# Patient Record
Sex: FEMALE | Race: WHITE | NOT HISPANIC OR LATINO | Employment: OTHER | URBAN - METROPOLITAN AREA
[De-identification: names, ages, dates, MRNs, and addresses within clinical notes are randomized per-mention and may not be internally consistent; named-entity substitution may affect disease eponyms.]

---

## 2018-11-07 ENCOUNTER — OFFICE VISIT (OUTPATIENT)
Dept: ORTHOPEDIC SURGERY | Facility: CLINIC | Age: 68
End: 2018-11-07

## 2018-11-07 VITALS — BODY MASS INDEX: 22.36 KG/M2 | WEIGHT: 130.95 LBS | HEART RATE: 83 BPM | HEIGHT: 64 IN | OXYGEN SATURATION: 98 %

## 2018-11-07 DIAGNOSIS — R52 PAIN: Primary | ICD-10-CM

## 2018-11-07 DIAGNOSIS — M76.72 PERONEAL TENDINITIS OF LEFT LOWER EXTREMITY: ICD-10-CM

## 2018-11-07 DIAGNOSIS — M05.79 RHEUMATOID ARTHRITIS INVOLVING MULTIPLE SITES WITH POSITIVE RHEUMATOID FACTOR (HCC): ICD-10-CM

## 2018-11-07 PROCEDURE — 99203 OFFICE O/P NEW LOW 30 MIN: CPT | Performed by: ORTHOPAEDIC SURGERY

## 2018-11-07 RX ORDER — ESTRADIOL 1 MG/1
1 TABLET ORAL WEEKLY
Refills: 3 | COMMUNITY
Start: 2018-10-09 | End: 2020-12-14

## 2018-11-07 RX ORDER — BUPROPION HYDROCHLORIDE 150 MG/1
150 TABLET ORAL DAILY
Refills: 3 | COMMUNITY
Start: 2018-10-09 | End: 2020-08-24

## 2018-11-07 RX ORDER — TRAZODONE HYDROCHLORIDE 50 MG/1
100 TABLET ORAL NIGHTLY PRN
Refills: 3 | COMMUNITY
Start: 2018-10-09

## 2018-11-07 NOTE — PROGRESS NOTES
NEW PATIENT    Patient: Clare Gao  : 1950    Primary Care Provider: Chris Cid MD    Requesting Provider: As above    Pain of the Left Foot      History    Chief Complaint: left foot pain     History of Present Illness: this is an extremely pleasant 68 year old woman with 1+ year history of pain and swelling over the base of the left fifth metatarsal.  No specific history of injury.  It is worse with activity, better with rest.  She rates the pain as 7-9 out of 10, sharp and aching.  She also occasionally has had pain under the right foot second metatarsal head.  She has seen podiatry and had injections in both areas.  She reports that she had a left fifth metatarsal base injection about 8-9 months ago, it helped for a short period of time.  She has not had orthotics.  About 6 months ago she was diagnosed with rheumatoid arthritis.  She is not currently on any of the rheumatic medications.  She sees a hand therapist, Dr. Diaz Vazquez, for pain in her hands.  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    No current outpatient prescriptions on file prior to visit.     No current facility-administered medications on file prior to visit.       Allergies   Allergen Reactions   • Codeine GI Intolerance      Past Medical History:   Diagnosis Date   • Osteoarthritis      Past Surgical History:   Procedure Laterality Date   • HYSTERECTOMY       Family History   Problem Relation Age of Onset   • Diabetes Father       Social History     Social History   • Marital status:      Spouse name: N/A   • Number of children: N/A   • Years of education: N/A     Occupational History   • Not on file.     Social History Main Topics   • Smoking status: Never Smoker   • Smokeless tobacco: Never Used   • Alcohol use No   • Drug use: No   • Sexual activity: Defer     Other Topics Concern   • Not on file     Social History Narrative   • No narrative on file     "    Review of Systems   Constitutional: Positive for fatigue.   HENT: Negative.    Eyes: Positive for pain and itching.   Respiratory: Negative.    Cardiovascular: Negative.    Gastrointestinal: Positive for constipation and rectal pain.   Endocrine: Positive for cold intolerance.   Genitourinary: Positive for urgency.   Musculoskeletal: Positive for neck pain and neck stiffness.   Skin: Positive for rash.   Allergic/Immunologic: Negative.    Neurological: Negative.    Hematological: Negative.    Psychiatric/Behavioral: The patient is nervous/anxious.    All other systems reviewed and are negative.      The following portions of the patient's history were reviewed and updated as appropriate: allergies, current medications, past family history, past medical history, past social history, past surgical history and problem list.    Physical Exam:   Pulse 83   Ht 162.6 cm (64\")   Wt 59.4 kg (130 lb 15.3 oz)   SpO2 98%   BMI 22.48 kg/m²   GENERAL: Body habitus: normal weight for height    Lower extremity edema: Right: none; Leftt: none    Varicose veins:  Right: none; Left: none    Gait: normal     Mental Status:  awake and alert; oriented to person, place, and time    Voice:  clear  SKIN:  Normal and warm and dry    Hair Growth:  Right:normal; Left:  normal  NAILS: Toenails: normal  HEENT: Head: Normocephalic, atraumatic,  without obvious abnormality.  eye: normal external eye, no icterus  ears: normal external ears  nose: normal external nose  pharynx: dental hygiene adequate  PULM:  Repiratory effort normal  CV:  Dorsalis Pedis:  Right: 2+; Left:2+    Posterior Tibial: Right:2+; Left:2+    Capillary Refill:  Brisk  MSK:  Hand:right handed and moderate arthritis      Tibia:  Right:  non tender; Left:  non tender      Ankle:  Right: non tender, ROM  normal and symmetric and motor function  normal; Left:  non tender, ROM  normal and symmetric and motor function  normal      Foot:  Right:  Not tender to palpation " today, she notes that when it is tender it's under the second metatarsal head.  Mild varus of the toes, no tenderness throughout the foot, no synovitis; Left:  Tender with swelling at the base of the fifth metatarsal and insertion of the peroneus brevis, also tender over the cuboid, mild varus of the toes but no tenderness.  Pain with resisted testing of the peroneus brevis.  No other areas of tenderness, no other swelling      NEURO: Heel Walking:  Right:  normal; Left:  normal    Toe Walking:  Right:  normal; Left:  normal and Some pain at the insertion of the peroneus brevis     [place]-Fani 5.07 monofilament test: normal    Lower extremity sensation: intact     Reflexes:  Biceps:  Right:  2+; Left:  2+           Quads:  Right:  2+; Left:  2+           Ankle:  Right:  2+; Left:  2+      Calf Atrophy:none    Motor Function: all 5/5         Medical Decision Making    Data Review:   ordered and reviewed x-rays today and reviewed outside records    Assessment and Plan/ Diagnosis/Treatment options: I reviewed the podiatry records  1.  Rheumatoid arthritis involving multiple sites with positive rheumatoid factor (CMS/HCC)  She has swelling at the insertion of the peroneus brevis, I suspect that this is tendinitis, possibly due to her rheumatoid disease.  She is not on any rheumatoid medications at present.  When I asked her why she wasn't taking anything, she reports she really does not like to take synthetic medication.  She is also tender over the cuboid.  Before making any treatment decisions such as orthotics or repeat injection, I think we need more information.  I would recommend an MRI.  That will give us a good look at the tendon and any inflammatory tissue.  I will see her following the MRI.  - MRI Foot Left Without Contrast; Future    2. Peroneal tendinitis of left lower extremity  As above

## 2018-11-19 ENCOUNTER — HOSPITAL ENCOUNTER (OUTPATIENT)
Dept: MRI IMAGING | Facility: HOSPITAL | Age: 68
Discharge: HOME | End: 2018-11-19
Attending: ORTHOPAEDIC SURGERY | Admitting: ORTHOPAEDIC SURGERY

## 2018-11-19 DIAGNOSIS — M05.79 RHEUMATOID ARTHRITIS INVOLVING MULTIPLE SITES WITH POSITIVE RHEUMATOID FACTOR (HCC): ICD-10-CM

## 2018-11-19 PROCEDURE — 73718 MRI LOWER EXTREMITY W/O DYE: CPT

## 2018-12-07 ENCOUNTER — TELEPHONE (OUTPATIENT)
Dept: ORTHOPEDIC SURGERY | Facility: CLINIC | Age: 68
End: 2018-12-07

## 2018-12-07 NOTE — TELEPHONE ENCOUNTER
The patient has been very sick and had to cancel her apt today for her MRI follow up. She wanted to know if someone could call her with the results. I told her she would need to come in for an apt and she did not want to R/S at this time because she doesn't know when she will be feeling better.

## 2018-12-07 NOTE — TELEPHONE ENCOUNTER
The MRI shows tendinitis at the area where she hurts, she needs to come in - I would recommend orthotics and physical therapy, if that does not help enough I would put her in a cast

## 2018-12-11 NOTE — TELEPHONE ENCOUNTER
The patient returned my call. I gave her the results and offered to schedule her an apt. She said she is still very sick and she will call back when she is feeling better and schedule an apt.

## 2019-01-09 ENCOUNTER — OFFICE VISIT (OUTPATIENT)
Dept: ORTHOPEDIC SURGERY | Facility: CLINIC | Age: 69
End: 2019-01-09

## 2019-01-09 VITALS — HEIGHT: 64 IN | HEART RATE: 98 BPM | BODY MASS INDEX: 22.48 KG/M2 | OXYGEN SATURATION: 98 %

## 2019-01-09 DIAGNOSIS — M05.79 RHEUMATOID ARTHRITIS INVOLVING MULTIPLE SITES WITH POSITIVE RHEUMATOID FACTOR (HCC): ICD-10-CM

## 2019-01-09 DIAGNOSIS — M76.72 PERONEAL TENDINITIS OF LEFT LOWER EXTREMITY: Primary | ICD-10-CM

## 2019-01-09 PROCEDURE — 99213 OFFICE O/P EST LOW 20 MIN: CPT | Performed by: ORTHOPAEDIC SURGERY

## 2019-01-09 NOTE — PROGRESS NOTES
ESTABLISHED PATIENT    Patient: Clare Gao  : 1950    Primary Care Provider: Chris Cid MD    Requesting Provider: As above    Follow-up (MRI Left foot 18)      History    Chief Complaint: Left foot pain    History of Present Illness: She returns after a long absence.  She reports that after we did the MRIs she became very ill with no for respiratory problem.  She only recently has felt well enough to return for follow-up.  Because of the illness she had significant enforced rest, and the foot pain has essentially resolved.  She is currently going to physical therapy for some other problems.  I discussed the MRI with her, I went over it.  I explained that it showed some insertional tendinitis of the peroneus brevis.    Current Outpatient Medications on File Prior to Visit   Medication Sig Dispense Refill   • buPROPion XL (WELLBUTRIN XL) 150 MG 24 hr tablet Take 150 mg by mouth Daily.  3   • estradiol (ESTRACE) 1 MG tablet Take 1 mg by mouth Daily.  3   • traZODone (DESYREL) 50 MG tablet Take 100 mg by mouth every night at bedtime.  3     No current facility-administered medications on file prior to visit.       Allergies   Allergen Reactions   • Codeine GI Intolerance      Past Medical History:   Diagnosis Date   • Osteoarthritis      Past Surgical History:   Procedure Laterality Date   • HYSTERECTOMY       Family History   Problem Relation Age of Onset   • Diabetes Father       Social History     Socioeconomic History   • Marital status:      Spouse name: Not on file   • Number of children: Not on file   • Years of education: Not on file   • Highest education level: Not on file   Social Needs   • Financial resource strain: Not on file   • Food insecurity - worry: Not on file   • Food insecurity - inability: Not on file   • Transportation needs - medical: Not on file   • Transportation needs - non-medical: Not on file   Occupational History   • Not on file   Tobacco Use   • Smoking  "status: Never Smoker   • Smokeless tobacco: Never Used   Substance and Sexual Activity   • Alcohol use: No   • Drug use: No   • Sexual activity: Defer   Other Topics Concern   • Not on file   Social History Narrative   • Not on file        Review of Systems   Constitutional: Negative.    HENT: Negative.    Eyes: Negative.    Respiratory: Negative.    Cardiovascular: Negative.    Gastrointestinal: Negative.    Endocrine: Negative.    Genitourinary: Negative.    Musculoskeletal: Positive for arthralgias (foot pain).   Skin: Negative.    Allergic/Immunologic: Negative.    Neurological: Negative.    Hematological: Negative.    Psychiatric/Behavioral: Negative.        The following portions of the patient's history were reviewed and updated as appropriate: allergies, current medications, past family history, past medical history, past social history, past surgical history and problem list.    Physical Exam:   Pulse 98   Ht 162 cm (63.78\")   SpO2 98%   BMI 22.48 kg/m²   GENERAL: Gait: normal       MSK:  Ankle:  Right: non tender; Left:  non tender        Foot:  Right:  non tender; Left:  No tenderness in the left foot at all, no tenderness over the brevis    NEURO Sensation:  intact     Medical Decision Making    Data Review:   reviewed radiology images and reviewed radiology results    Assessment/Plan/Diagnosis/Treatment Options:   1. Peroneal tendinitis of left lower extremity  She is much improved.  Her symptoms are essentially resolved.  I explained that if I had seen her after the MRI I would probably have put her in a cast.  However after casting I then sent patient's to physical therapy.  I think it's okay for her to go directly to therapy since the symptoms are much better.  I will see her again any time.    2. Rheumatoid arthritis involving multiple sites with positive rheumatoid factor (CMS/[facility])  As above                            "

## 2020-05-01 ENCOUNTER — TELEMEDICINE (OUTPATIENT)
Dept: ORTHOPEDIC SURGERY | Facility: CLINIC | Age: 70
End: 2020-05-01

## 2020-05-01 DIAGNOSIS — M77.42 METATARSALGIA OF LEFT FOOT: ICD-10-CM

## 2020-05-01 DIAGNOSIS — M05.79 RHEUMATOID ARTHRITIS INVOLVING MULTIPLE SITES WITH POSITIVE RHEUMATOID FACTOR (HCC): Primary | ICD-10-CM

## 2020-05-01 PROCEDURE — 99213 OFFICE O/P EST LOW 20 MIN: CPT | Performed by: ORTHOPAEDIC SURGERY

## 2020-05-01 NOTE — PROGRESS NOTES
"ESTABLISHED PATIENT    Patient: Clare Peace  : 1950    Primary Care Provider: Chris Cid MD    Requesting Provider: As above    No chief complaint on file.      History    Chief Complaint: new foot pain    History of Present Illness: this is a video visit due to the pandemic.  She has a new problem left foot.  She has pain and swelling under the 2nd MTPJ.  The prior peroneal tendon problem has resolved.  She has had this pain 4-6 weeks, worse with weight bearing, better with rest and padding.  She had been walking for exercise and doing water aerobics, decreased now due to the pain.  She has RA and has tried new supplements- hyaluronic acid, glucosamine and chondroitin, MSM, and one called \"astralgus\" that I am not familiar with.  She reports that this one causes nausea, I recommend she stop it.  The others are reasonable and cause no harm.     Current Outpatient Medications on File Prior to Visit   Medication Sig Dispense Refill   • buPROPion XL (WELLBUTRIN XL) 150 MG 24 hr tablet Take 150 mg by mouth Daily.  3   • estradiol (ESTRACE) 1 MG tablet Take 1 mg by mouth Daily.  3   • traZODone (DESYREL) 50 MG tablet Take 100 mg by mouth every night at bedtime.  3     No current facility-administered medications on file prior to visit.       Allergies   Allergen Reactions   • Codeine GI Intolerance      Past Medical History:   Diagnosis Date   • Osteoarthritis      Past Surgical History:   Procedure Laterality Date   • HYSTERECTOMY       Family History   Problem Relation Age of Onset   • Diabetes Father       Social History     Socioeconomic History   • Marital status:      Spouse name: Not on file   • Number of children: Not on file   • Years of education: Not on file   • Highest education level: Not on file   Tobacco Use   • Smoking status: Never Smoker   • Smokeless tobacco: Never Used   Substance and Sexual Activity   • Alcohol use: No   • Drug use: No   • Sexual activity: Defer        Review " of Systems   Constitutional: Negative.    HENT: Negative.    Eyes: Negative.    Respiratory: Negative.    Cardiovascular: Negative.    Gastrointestinal: Negative.    Endocrine: Negative.    Genitourinary: Negative.    Musculoskeletal: Positive for arthralgias and gait problem.   Skin: Negative.    Allergic/Immunologic: Negative.    Hematological: Negative.    Psychiatric/Behavioral: Negative.        The following portions of the patient's history were reviewed and updated as appropriate: allergies, current medications, past family history, past medical history, past social history, past surgical history and problem list.    Physical Exam:   There were no vitals taken for this visit.  GENERAL: Body habitus: normal weight for height    No direct physical exam, but she was able to show me her foot and the location of the pain, under the 2nd MTPJ  Medical Decision Making    Data Review:   none    Assessment/Plan/Diagnosis/Treatment Options:   1. Rheumatoid arthritis involving multiple sites with positive rheumatoid factor (CMS/[facility])  I went over the supplements with her, she showed me the bottles.  I recommend she stop the one that causes nausea.      2. Metatarsalgia of left foot  She has synovitis of the 2nd MTPJ, early hammertoe.  I recommend custom orthotics.  We will mail the prescription to her.  I recommend [place] shoes.  She reports she had a similar problem in the right foot in the past, better with cortisone injection.  I explained I usually reserve injection for patients who do not get better with orthotics, because it increases the risk of dislocation of the joint.  I recommend she try the orthotics 6-8 weeks, return if not improved.  She agrees. 10min spent in video.

## 2020-08-19 PROBLEM — E78.5 HYPERLIPIDEMIA LDL GOAL <100: Status: ACTIVE | Noted: 2020-08-19

## 2020-08-19 PROBLEM — R60.0 LOCALIZED EDEMA: Status: ACTIVE | Noted: 2020-08-19

## 2020-08-19 PROBLEM — R06.02 SOB (SHORTNESS OF BREATH) ON EXERTION: Status: ACTIVE | Noted: 2020-08-19

## 2020-08-19 PROBLEM — R07.9 CHEST PAIN: Status: ACTIVE | Noted: 2020-08-19

## 2020-08-19 NOTE — PROGRESS NOTES
OFFICE VISIT  NOTE  [facility] CARDIOLOGY      Name: Clare Peace    Date: 2020  MRN:  [id number]  :  1950      REFERRING/PRIMARY PROVIDER:  Angela Keating APRN    Chief Complaint   Patient presents with   • Family History of Ischemic Heart Disease       HPI: Clare Peace is a 69 y.o. female who presents today for new consultation for chest pain.  She has a strong family history of premature CAD, her brother  at age 58 from a myocardial infarction, sister  at age 64 after CABG, father  of strokes in 50s, she has lifelong hypercholesterolemia as well as her whole family.  She reports episodes of chest pain, usually with stress, substernal, pressure, nonradiating, goes away with rest after several minutes to up to an hour, no diaphoresis or nausea.  Last stress test 2018 was a stress echo and was normal.  She denies dyspnea and lower extreme edema or PND.  She reports taking a statin several years ago and was intolerant due to muscle pain.  Recent lipids were reviewed, total cholesterol 325, , .    Past Medical History:   Diagnosis Date   • Osteoarthritis        Past Surgical History:   Procedure Laterality Date   •  SECTION     • HYSTERECTOMY         Social History     Socioeconomic History   • Marital status:      Spouse name: Not on file   • Number of children: Not on file   • Years of education: Not on file   • Highest education level: Not on file   Tobacco Use   • Smoking status: Never Smoker   • Smokeless tobacco: Never Used   Substance and Sexual Activity   • Alcohol use: Yes     Alcohol/week: 1.0 standard drinks     Types: 1 Glasses of wine per week     Comment: Occassionally   • Drug use: No   • Sexual activity: Defer       Family History   Problem Relation Age of Onset   • Diabetes Father    • Stroke Father    • Heart disease Sister    • Heart disease Brother         ROS:   Constitutional no fever,  no weight loss   Skin  "no rash, no subcutaneous nodules   Otolaryngeal no difficulty swallowing   Cardiovascular See HPI   Pulmonary no cough, no sputum production   Gastrointestinal no constipation, no diarrhea   Genitourinary no dysuria, no hematuria   Hematologic no easy bruisability, no abnormal bleeding   Musculoskeletal no muscle pain   Neurologic no dizziness, no falls         Allergies   Allergen Reactions   • Codeine GI Intolerance         Current Outpatient Medications:   •  cholecalciferol (VITAMIN D3) 25 MCG (1000 UT) tablet, Take 2,000 Units by mouth Daily., Disp: , Rfl:   •  estradiol (ESTRACE) 1 MG tablet, Take 1 mg by mouth 1 (One) Time Per Week., Disp: , Rfl: 3  •  traZODone (DESYREL) 50 MG tablet, Take 100 mg by mouth At Night As Needed., Disp: , Rfl: 3  •  Turmeric 500 MG tablet, Take 1 tablet by mouth Daily., Disp: , Rfl:   •  atorvastatin (LIPITOR) 40 MG tablet, Take 1 tablet by mouth Daily., Disp: 90 tablet, Rfl: 3    Vitals:    08/24/20 1408   BP: 108/70   BP Location: Right arm   Patient Position: Sitting   Pulse: 75   Temp: 97.5 °F (36.4 °C)   SpO2: 98%   Weight: 57 kg (125 lb 9.6 oz)   Height: 165.1 cm (65\")     Body mass index is 20.9 kg/m².    PHYSICAL EXAM:    General Appearance:   · well developed  · well nourished  HENT:   · oropharynx moist  · lips not cyanotic  Neck:  · thyroid not enlarged  · supple  Respiratory:  · no respiratory distress  · normal breath sounds  · no rales  Cardiovascular:  · no jugular venous distention  · regular rhythm  · apical impulse normal  · S1 normal, S2 normal  · no S3, no S4   · no murmur  · no rub, no thrill  · carotid pulses normal; no bruit  · pedal pulses normal  · lower extremity edema: none    Gastrointestinal:   · bowel sounds normal  · non-tender  · no hepatomegaly, no splenomegaly  Musculoskeletal:  · no clubbing of fingers.   · normocephalic, head atraumatic  Skin:   · warm, dry  Psychiatric:  · judgement and insight appropriate  · normal mood and affect    RESULTS: "     ECG 12 Lead  Date/Time: 8/24/2020 3:58 PM  Performed by: Prince Mccollum MD  Authorized by: Prince Mccollum MD   Comparison: not compared with previous ECG   Previous ECG: no previous ECG available  Rhythm: sinus rhythm  Rate: normal  QRS axis: normal    Clinical impression: normal ECG                   Labs:  CBC 07/17/20   WBC 27.4   RBC 4.72   Hemoglobin 14.4   Hematocrit 44.8   Platelet 299       CMP  07/17/20   Glucose 89   BUN 22   Creatinine 0.82   Glomerular Filtration Rate 71   BUN/Creatinine Ratio NA   Sodium 139   Potassium 4.3   Carbon Dioxide 18 L   Calcium 9.4   AST 19   ALT 18       LIPID PANEL 07/17/20   Total 325 H   Triglycerides 83       H       TSH 1.12         ASSESSMENT:  Problem List Items Addressed This Visit        Cardiovascular and Mediastinum    Hyperlipidemia LDL goal <100 - Primary    Relevant Medications    atorvastatin (LIPITOR) 40 MG tablet    Other Relevant Orders    CT Angiogram Coronary       Respiratory    SOB (shortness of breath) on exertion    Relevant Orders    CT Angiogram Coronary       Nervous and Auditory    Chest pain    Overview     01/31/18 Treadmill echo  · Good exercise tolerance  · No ischemic EKG changes  · No obvious regional wall motion abnormalities          Relevant Orders    CT Angiogram Coronary       Other    Localized edema          PLAN:    1.  Chest pain:  Given her strong family history of premature CAD and severe hypercholesterolemia, I recommend coronary CTA for further evaluation.  Start aspirin 81 mg daily    The patient was advised to call 911 if chest pain worsens or persist despite nitroglycerin or rest.    2.  Severe hypercholesterolemia:  Likely familial given the severity of her lipids  Lifetime ASCVD risk 39%  Start atorvastatin 40 mg daily  If she is intolerant due to myalgia I would recommend Crestor then pravastatin then Repatha  Low saturated fat diet recommended.    3.  Family history of premature CAD:  Proceed with  coronary CTA.  Start atorvastatin      Return to clinic in 4 months, or sooner as needed.    Thank you for the opportunity to share in the care of your patient; please do not hesitate to call me with any questions.     Prince Mccollum MD, [facility]  Office: [phone] [address] [place] [address]    08/24/20

## 2020-08-24 ENCOUNTER — CONSULT (OUTPATIENT)
Dept: CARDIOLOGY | Facility: CLINIC | Age: 70
End: 2020-08-24

## 2020-08-24 VITALS
TEMPERATURE: 97.5 F | HEIGHT: 65 IN | SYSTOLIC BLOOD PRESSURE: 108 MMHG | OXYGEN SATURATION: 98 % | WEIGHT: 125.6 LBS | DIASTOLIC BLOOD PRESSURE: 70 MMHG | BODY MASS INDEX: 20.93 KG/M2 | HEART RATE: 75 BPM

## 2020-08-24 DIAGNOSIS — E78.5 HYPERLIPIDEMIA LDL GOAL <100: Primary | ICD-10-CM

## 2020-08-24 DIAGNOSIS — R60.0 LOCALIZED EDEMA: ICD-10-CM

## 2020-08-24 DIAGNOSIS — R07.9 CHEST PAIN, UNSPECIFIED TYPE: ICD-10-CM

## 2020-08-24 DIAGNOSIS — R06.02 SOB (SHORTNESS OF BREATH) ON EXERTION: ICD-10-CM

## 2020-08-24 PROCEDURE — 93000 ELECTROCARDIOGRAM COMPLETE: CPT | Performed by: INTERNAL MEDICINE

## 2020-08-24 PROCEDURE — 99204 OFFICE O/P NEW MOD 45 MIN: CPT | Performed by: INTERNAL MEDICINE

## 2020-08-24 RX ORDER — ATORVASTATIN CALCIUM 40 MG/1
40 TABLET, FILM COATED ORAL DAILY
Qty: 90 TABLET | Refills: 3 | Status: SHIPPED | OUTPATIENT
Start: 2020-08-24 | End: 2020-09-01 | Stop reason: SINTOL

## 2020-08-24 RX ORDER — MELATONIN
2000 DAILY
Status: ON HOLD | COMMUNITY
End: 2020-10-13

## 2020-08-24 RX ORDER — TURMERIC ROOT EXTRACT 500 MG
1 TABLET ORAL 2 TIMES DAILY
COMMUNITY

## 2020-09-01 ENCOUNTER — TELEPHONE (OUTPATIENT)
Dept: CARDIOLOGY | Facility: CLINIC | Age: 70
End: 2020-09-01

## 2020-09-01 RX ORDER — ROSUVASTATIN CALCIUM 20 MG/1
20 TABLET, COATED ORAL NIGHTLY
Qty: 90 TABLET | Refills: 0 | Status: SHIPPED | OUTPATIENT
Start: 2020-09-01 | End: 2020-11-03

## 2020-09-01 NOTE — TELEPHONE ENCOUNTER
Pt calls today with negative side effects from Atorvastatin 40 mg. She reports extreme myalgia, nausea, diarrhea, and a UTI about a day or two after starting it. Per [facility] LONG on 08/24/20 we will try Crestor. Advised pt to let us know if she is intolerant to Crestor to let us know and we will try pravastatin then Repatha. Advised we need to try some statin therapy before insurance will approve Repatha. Pt requested I send Crestor to [facility] so it is cheaper for her. She will stop Atorvastatin while waiting on Crestor and note if side effects improve. Pt agreeable to plan and verbalized understanding.

## 2020-10-02 ENCOUNTER — HOSPITAL ENCOUNTER (OUTPATIENT)
Dept: CT IMAGING | Facility: HOSPITAL | Age: 70
Discharge: HOME | End: 2020-10-02
Admitting: INTERNAL MEDICINE

## 2020-10-02 ENCOUNTER — TELEPHONE (OUTPATIENT)
Dept: CARDIOLOGY | Facility: CLINIC | Age: 70
End: 2020-10-02

## 2020-10-02 VITALS
WEIGHT: 121.9 LBS | BODY MASS INDEX: 20.31 KG/M2 | HEIGHT: 65 IN | OXYGEN SATURATION: 99 % | DIASTOLIC BLOOD PRESSURE: 63 MMHG | RESPIRATION RATE: 18 BRPM | SYSTOLIC BLOOD PRESSURE: 112 MMHG | HEART RATE: 57 BPM

## 2020-10-02 DIAGNOSIS — R07.9 CHEST PAIN, UNSPECIFIED TYPE: ICD-10-CM

## 2020-10-02 DIAGNOSIS — R93.1 ELEVATED CORONARY ARTERY CALCIUM SCORE: Primary | ICD-10-CM

## 2020-10-02 DIAGNOSIS — R06.02 SOB (SHORTNESS OF BREATH) ON EXERTION: ICD-10-CM

## 2020-10-02 DIAGNOSIS — E78.5 HYPERLIPIDEMIA LDL GOAL <100: ICD-10-CM

## 2020-10-02 PROCEDURE — 75571 CT HRT W/O DYE W/CA TEST: CPT

## 2020-10-02 PROCEDURE — 82565 ASSAY OF CREATININE: CPT

## 2020-10-02 PROCEDURE — 75574 CT ANGIO HRT W/3D IMAGE: CPT

## 2020-10-02 RX ORDER — NITROGLYCERIN 0.4 MG/1
TABLET SUBLINGUAL
Status: DISPENSED
Start: 2020-10-02 | End: 2020-10-02

## 2020-10-02 RX ORDER — METOPROLOL TARTRATE 50 MG/1
100 TABLET, FILM COATED ORAL ONCE AS NEEDED
Status: DISCONTINUED | OUTPATIENT
Start: 2020-10-02 | End: 2020-10-03 | Stop reason: HOSPADM

## 2020-10-02 RX ORDER — METOPROLOL TARTRATE 50 MG/1
50 TABLET, FILM COATED ORAL ONCE AS NEEDED
Status: DISCONTINUED | OUTPATIENT
Start: 2020-10-02 | End: 2020-10-03 | Stop reason: HOSPADM

## 2020-10-02 RX ORDER — SODIUM PHOSPHATE,MONO-DIBASIC 19G-7G/118
1 ENEMA (ML) RECTAL DAILY
COMMUNITY

## 2020-10-02 RX ORDER — NITROGLYCERIN 0.4 MG/1
0.4 TABLET SUBLINGUAL
Status: CANCELLED | OUTPATIENT
Start: 2020-10-02 | End: 2020-10-02

## 2020-10-02 RX ORDER — LIDOCAINE HYDROCHLORIDE 10 MG/ML
5 INJECTION, SOLUTION EPIDURAL; INFILTRATION; INTRACAUDAL; PERINEURAL AS NEEDED
Status: DISCONTINUED | OUTPATIENT
Start: 2020-10-02 | End: 2020-10-03 | Stop reason: HOSPADM

## 2020-10-02 NOTE — NURSING NOTE
Dr Campoverde at bedside, reviewing images, feels like there is too much calcium to proceed and that she would benefit from a catheterization.

## 2020-10-02 NOTE — TELEPHONE ENCOUNTER
Pt calling today regarding her CT cardiac calcium. She reports they stopped the test due to extensive LAD involvement and recommended a LHC. This has her very scared and she would like to know if we need to go ahead and proceed with a heart cath? Total calcium scoring 225.4, strong family history of premature CAD and severe hypercholesterolemia (LDL in June 2020 was 205). She had stopped atorvastatin due to myalgia and did not start the rosuvastatin RDS recommended due to side effects- she is not on any statin therapy at this time. Advised she needs to start the rosuvastatin and she has agreed to this. Pt is amendable to coming in for LHC if this is the plan. Please advise how we can proceed?

## 2020-10-05 NOTE — TELEPHONE ENCOUNTER
Per [facility] we will set up for LHC- spoke with pt this morning. She is still agreeable and will await our call to schedule. Pt asked if Elvi could wait till after 11:30 am today to call her as she has appointments this morning. No further questions at this time.

## 2020-10-06 PROBLEM — R93.1 ELEVATED CORONARY ARTERY CALCIUM SCORE: Status: ACTIVE | Noted: 2020-10-06

## 2020-10-06 LAB — CREAT BLDA-MCNC: 0.5 MG/DL (ref 0.6–1.3)

## 2020-10-07 ENCOUNTER — PREP FOR SURGERY (OUTPATIENT)
Dept: OTHER | Facility: HOSPITAL | Age: 70
End: 2020-10-07

## 2020-10-07 DIAGNOSIS — R93.1 ABNORMAL SCREENING CARDIAC CT: Primary | ICD-10-CM

## 2020-10-07 RX ORDER — SODIUM CHLORIDE 0.9 % (FLUSH) 0.9 %
10 SYRINGE (ML) INJECTION AS NEEDED
Status: CANCELLED | OUTPATIENT
Start: 2020-10-07

## 2020-10-07 RX ORDER — ASPIRIN 81 MG/1
81 TABLET ORAL DAILY
Status: CANCELLED | OUTPATIENT
Start: 2020-10-08

## 2020-10-07 RX ORDER — ONDANSETRON 2 MG/ML
4 INJECTION INTRAMUSCULAR; INTRAVENOUS EVERY 6 HOURS PRN
Status: CANCELLED | OUTPATIENT
Start: 2020-10-07

## 2020-10-07 RX ORDER — SODIUM CHLORIDE 9 MG/ML
3 INJECTION, SOLUTION INTRAVENOUS CONTINUOUS
Status: CANCELLED | OUTPATIENT
Start: 2020-10-07 | End: 2020-10-07

## 2020-10-07 RX ORDER — LIDOCAINE HYDROCHLORIDE 10 MG/ML
0.1 INJECTION, SOLUTION EPIDURAL; INFILTRATION; INTRACAUDAL; PERINEURAL ONCE AS NEEDED
Status: CANCELLED | OUTPATIENT
Start: 2020-10-07

## 2020-10-07 RX ORDER — SODIUM CHLORIDE 0.9 % (FLUSH) 0.9 %
3 SYRINGE (ML) INJECTION EVERY 12 HOURS SCHEDULED
Status: CANCELLED | OUTPATIENT
Start: 2020-10-07

## 2020-10-07 RX ORDER — NITROGLYCERIN 0.4 MG/1
0.4 TABLET SUBLINGUAL
Status: CANCELLED | OUTPATIENT
Start: 2020-10-07

## 2020-10-07 RX ORDER — ASPIRIN 81 MG/1
324 TABLET, CHEWABLE ORAL ONCE
Status: CANCELLED | OUTPATIENT
Start: 2020-10-07 | End: 2020-10-07

## 2020-10-11 ENCOUNTER — APPOINTMENT (OUTPATIENT)
Dept: PREADMISSION TESTING | Facility: HOSPITAL | Age: 70
End: 2020-10-11

## 2020-10-11 LAB — SARS-COV-2 RNA RESP QL NAA+PROBE: NOT DETECTED

## 2020-10-11 PROCEDURE — C9803 HOPD COVID-19 SPEC COLLECT: HCPCS

## 2020-10-11 PROCEDURE — U0004 COV-19 TEST NON-CDC HGH THRU: HCPCS

## 2020-10-13 ENCOUNTER — HOSPITAL ENCOUNTER (OUTPATIENT)
Facility: HOSPITAL | Age: 70
Discharge: HOME | End: 2020-10-13
Attending: INTERNAL MEDICINE | Admitting: INTERNAL MEDICINE

## 2020-10-13 VITALS
HEART RATE: 70 BPM | HEIGHT: 65 IN | TEMPERATURE: 98.5 F | WEIGHT: 120.37 LBS | DIASTOLIC BLOOD PRESSURE: 68 MMHG | SYSTOLIC BLOOD PRESSURE: 113 MMHG | RESPIRATION RATE: 16 BRPM | OXYGEN SATURATION: 97 % | BODY MASS INDEX: 20.06 KG/M2

## 2020-10-13 DIAGNOSIS — R93.1 ELEVATED CORONARY ARTERY CALCIUM SCORE: ICD-10-CM

## 2020-10-13 DIAGNOSIS — R93.1 ABNORMAL SCREENING CARDIAC CT: ICD-10-CM

## 2020-10-13 PROBLEM — I25.118 CORONARY ARTERY DISEASE OF NATIVE ARTERY OF NATIVE HEART WITH STABLE ANGINA PECTORIS: Status: ACTIVE | Noted: 2020-10-13

## 2020-10-13 LAB
ACT BLD: 285 SECONDS (ref 82–152)
ANION GAP SERPL CALCULATED.3IONS-SCNC: 7 MMOL/L (ref 5–15)
BUN SERPL-MCNC: 14 MG/DL (ref 8–23)
BUN/CREAT SERPL: 19.4 (ref 7–25)
CALCIUM SPEC-SCNC: 8.9 MG/DL (ref 8.6–10.5)
CHLORIDE SERPL-SCNC: 108 MMOL/L (ref 98–107)
CHOLEST SERPL-MCNC: 217 MG/DL (ref 0–200)
CO2 SERPL-SCNC: 24 MMOL/L (ref 22–29)
CREAT SERPL-MCNC: 0.72 MG/DL (ref 0.57–1)
DEPRECATED RDW RBC AUTO: 45.8 FL (ref 37–54)
ERYTHROCYTE [DISTWIDTH] IN BLOOD BY AUTOMATED COUNT: 13.3 % (ref 12.3–15.4)
GFR SERPL CREATININE-BSD FRML MDRD: 81 ML/MIN/1.73
GLUCOSE SERPL-MCNC: 96 MG/DL (ref 65–99)
HCT VFR BLD AUTO: 41.7 % (ref 34–46.6)
HDLC SERPL-MCNC: 87 MG/DL (ref 40–60)
HGB BLD-MCNC: 13.3 G/DL (ref 12–15.9)
LDLC SERPL CALC-MCNC: 118 MG/DL (ref 0–100)
LDLC/HDLC SERPL: 1.34 {RATIO}
MCH RBC QN AUTO: 30 PG (ref 26.6–33)
MCHC RBC AUTO-ENTMCNC: 31.9 G/DL (ref 31.5–35.7)
MCV RBC AUTO: 94.1 FL (ref 79–97)
PLATELET # BLD AUTO: 285 10*3/MM3 (ref 140–450)
PMV BLD AUTO: 9.8 FL (ref 6–12)
POTASSIUM SERPL-SCNC: 4.2 MMOL/L (ref 3.5–5.2)
RBC # BLD AUTO: 4.43 10*6/MM3 (ref 3.77–5.28)
SODIUM SERPL-SCNC: 139 MMOL/L (ref 136–145)
TRIGL SERPL-MCNC: 68 MG/DL (ref 0–150)
VLDLC SERPL-MCNC: 12 MG/DL (ref 5–40)
WBC # BLD AUTO: 5.67 10*3/MM3 (ref 3.4–10.8)

## 2020-10-13 PROCEDURE — 0 IOPAMIDOL PER 1 ML: Performed by: INTERNAL MEDICINE

## 2020-10-13 PROCEDURE — 80048 BASIC METABOLIC PNL TOTAL CA: CPT | Performed by: NURSE PRACTITIONER

## 2020-10-13 PROCEDURE — 25010000002 MIDAZOLAM PER 1 MG: Performed by: INTERNAL MEDICINE

## 2020-10-13 PROCEDURE — 82565 ASSAY OF CREATININE: CPT

## 2020-10-13 PROCEDURE — C1894 INTRO/SHEATH, NON-LASER: HCPCS | Performed by: INTERNAL MEDICINE

## 2020-10-13 PROCEDURE — 93571 IV DOP VEL&/PRESS C FLO 1ST: CPT | Performed by: INTERNAL MEDICINE

## 2020-10-13 PROCEDURE — 85027 COMPLETE CBC AUTOMATED: CPT | Performed by: NURSE PRACTITIONER

## 2020-10-13 PROCEDURE — 25010000002 HEPARIN (PORCINE) PER 1000 UNITS: Performed by: INTERNAL MEDICINE

## 2020-10-13 PROCEDURE — 80061 LIPID PANEL: CPT | Performed by: NURSE PRACTITIONER

## 2020-10-13 PROCEDURE — 25010000002 FENTANYL CITRATE (PF) 100 MCG/2ML SOLUTION: Performed by: INTERNAL MEDICINE

## 2020-10-13 PROCEDURE — 85347 COAGULATION TIME ACTIVATED: CPT

## 2020-10-13 PROCEDURE — C1769 GUIDE WIRE: HCPCS | Performed by: INTERNAL MEDICINE

## 2020-10-13 PROCEDURE — 93458 L HRT ARTERY/VENTRICLE ANGIO: CPT | Performed by: INTERNAL MEDICINE

## 2020-10-13 PROCEDURE — 93454 CORONARY ARTERY ANGIO S&I: CPT | Performed by: INTERNAL MEDICINE

## 2020-10-13 PROCEDURE — C1887 CATHETER, GUIDING: HCPCS | Performed by: INTERNAL MEDICINE

## 2020-10-13 RX ORDER — LIDOCAINE HYDROCHLORIDE 10 MG/ML
0.1 INJECTION, SOLUTION EPIDURAL; INFILTRATION; INTRACAUDAL; PERINEURAL ONCE AS NEEDED
Status: DISCONTINUED | OUTPATIENT
Start: 2020-10-13 | End: 2020-10-13 | Stop reason: HOSPADM

## 2020-10-13 RX ORDER — MELATONIN
1000 DAILY
COMMUNITY
End: 2022-05-09

## 2020-10-13 RX ORDER — SODIUM CHLORIDE 9 MG/ML
3 INJECTION, SOLUTION INTRAVENOUS CONTINUOUS
Status: ACTIVE | OUTPATIENT
Start: 2020-10-13 | End: 2020-10-13

## 2020-10-13 RX ORDER — NITROGLYCERIN 0.4 MG/1
0.4 TABLET SUBLINGUAL
Status: DISCONTINUED | OUTPATIENT
Start: 2020-10-13 | End: 2020-10-13 | Stop reason: HOSPADM

## 2020-10-13 RX ORDER — LIDOCAINE HYDROCHLORIDE 10 MG/ML
INJECTION, SOLUTION EPIDURAL; INFILTRATION; INTRACAUDAL; PERINEURAL AS NEEDED
Status: DISCONTINUED | OUTPATIENT
Start: 2020-10-13 | End: 2020-10-13 | Stop reason: HOSPADM

## 2020-10-13 RX ORDER — FENTANYL CITRATE 50 UG/ML
INJECTION, SOLUTION INTRAMUSCULAR; INTRAVENOUS AS NEEDED
Status: DISCONTINUED | OUTPATIENT
Start: 2020-10-13 | End: 2020-10-13 | Stop reason: HOSPADM

## 2020-10-13 RX ORDER — HEPARIN SODIUM 1000 [USP'U]/ML
INJECTION, SOLUTION INTRAVENOUS; SUBCUTANEOUS AS NEEDED
Status: DISCONTINUED | OUTPATIENT
Start: 2020-10-13 | End: 2020-10-13 | Stop reason: HOSPADM

## 2020-10-13 RX ORDER — SODIUM CHLORIDE 9 MG/ML
INJECTION, SOLUTION INTRAVENOUS CONTINUOUS PRN
Status: COMPLETED | OUTPATIENT
Start: 2020-10-13 | End: 2020-10-13

## 2020-10-13 RX ORDER — ONDANSETRON 2 MG/ML
4 INJECTION INTRAMUSCULAR; INTRAVENOUS EVERY 6 HOURS PRN
Status: DISCONTINUED | OUTPATIENT
Start: 2020-10-13 | End: 2020-10-13 | Stop reason: HOSPADM

## 2020-10-13 RX ORDER — ASPIRIN 81 MG/1
81 TABLET ORAL DAILY
Status: DISCONTINUED | OUTPATIENT
Start: 2020-10-14 | End: 2020-10-13 | Stop reason: HOSPADM

## 2020-10-13 RX ORDER — SODIUM CHLORIDE 0.9 % (FLUSH) 0.9 %
3 SYRINGE (ML) INJECTION EVERY 12 HOURS SCHEDULED
Status: DISCONTINUED | OUTPATIENT
Start: 2020-10-13 | End: 2020-10-13 | Stop reason: HOSPADM

## 2020-10-13 RX ORDER — ASPIRIN 81 MG/1
81 TABLET ORAL DAILY
Qty: 90 TABLET | Refills: 3 | Status: SHIPPED | OUTPATIENT
Start: 2020-10-13

## 2020-10-13 RX ORDER — ASPIRIN 81 MG/1
324 TABLET, CHEWABLE ORAL ONCE
Status: COMPLETED | OUTPATIENT
Start: 2020-10-13 | End: 2020-10-13

## 2020-10-13 RX ORDER — SODIUM CHLORIDE 0.9 % (FLUSH) 0.9 %
10 SYRINGE (ML) INJECTION AS NEEDED
Status: DISCONTINUED | OUTPATIENT
Start: 2020-10-13 | End: 2020-10-13 | Stop reason: HOSPADM

## 2020-10-13 RX ORDER — MIDAZOLAM HYDROCHLORIDE 1 MG/ML
INJECTION INTRAMUSCULAR; INTRAVENOUS AS NEEDED
Status: DISCONTINUED | OUTPATIENT
Start: 2020-10-13 | End: 2020-10-13 | Stop reason: HOSPADM

## 2020-10-13 RX ORDER — NITROGLYCERIN 5 MG/ML
INJECTION, SOLUTION INTRAVENOUS AS NEEDED
Status: DISCONTINUED | OUTPATIENT
Start: 2020-10-13 | End: 2020-10-13 | Stop reason: HOSPADM

## 2020-10-13 RX ADMIN — ASPIRIN 324 MG: 81 TABLET, CHEWABLE ORAL at 08:34

## 2020-10-13 RX ADMIN — SODIUM CHLORIDE 3 ML/KG/HR: 9 INJECTION, SOLUTION INTRAVENOUS at 08:16

## 2020-10-13 NOTE — H&P
[facility] Cardiology  H&P    Patient: Clare Peace  1950  [address]  [place], located within [facility] [address]    PCP:  Angela Keating APRN   Treatment Team:   Attending Provider: Prince Mccollum MD  Admitting Provider: Prince Mccollum MD   10/13/2020      DATE OF SERVICE: 10/13/2020 08:34 EDT     IDENTIFICATION: A 69 y.o. female      CHIEF COMPLAINT:  angina    PROBLEM LIST:    Elevated coronary artery calcium score        Past Medical History:   Diagnosis Date   • Osteoarthritis    • PONV (postoperative nausea and vomiting)      Past Surgical History:   Procedure Laterality Date   •  SECTION     • HYSTERECTOMY         Allergies  Allergies   Allergen Reactions   • Codeine GI Intolerance       Current Medications    Current Facility-Administered Medications:   •  aspirin chewable tablet 324 mg, 324 mg, Oral, Once **AND** [START ON 10/14/2020] aspirin EC tablet 81 mg, 81 mg, Oral, Daily, Anai Khan APRN  •  lidocaine PF 1% (XYLOCAINE) injection 0.1 mL, 0.1 mL, Intradermal, Once PRN, Anai Khan APRN  •  nitroglycerin (NITROSTAT) SL tablet 0.4 mg, 0.4 mg, Sublingual, Q5 Min PRN, Anai Khan APRN  •  ondansetron (ZOFRAN) injection 4 mg, 4 mg, Intravenous, Q6H PRN, Anai Khan APRN  •  sodium chloride 0.9 % flush 10 mL, 10 mL, Intravenous, PRN, Anai Khan APRN  •  sodium chloride 0.9 % flush 3 mL, 3 mL, Intravenous, Q12H, Anai Khan APRN  •  sodium chloride 0.9 % infusion, 3 mL/kg/hr, Intravenous, Continuous, Anai Khan APRN, Last Rate: 165.9 mL/hr at 10/13/20 0816, 3 mL/kg/hr at 10/13/20 0816  sodium chloride, 3 mL/kg/hr, Last Rate: 3 mL/kg/hr (10/13/20 08)        History of Present Illness   Clare Peace is a 69 y.o. year old female with a past medical history significant for severe hyperlipidemia LDL >200, family history of premature CAD who presents to [facility] today 10/13/2020 for left heart catheterization.  The patient was seen in  outpatient consultation with Dr. Mccollum on 2020 for chest pain in the setting of HLD and strong family history of premature CAD.    She describes occasional substernal pressure with stress that will usually resolve within several minutes of rest.  No associated dyspnea, nausea, or diaphoresis.  She had a stress test and stress echo that were WNL in 2018.  In  her lipids revealed  and LDL of 2 5.  Her brother  at age 58 from MI, sister  at 64 following CABG, father  from CVA in his 50s.  Is recommended for her to undergo a CT calcium score which was stopped mid test due to severe LAD involvement and elevated calcium score of 225.    Today she denies any CP, dyspnea, palpitations, LE edema, orthopnea, PND, claudication.  She reports she has not been taking her statin, atorvastatin gave her GI upset and she wanted to wait until after her heart cath to start rosuvastatin.    ROS  Review of Systems   Cardiovascular: Positive for chest pain.   Psychiatric/Behavioral: The patient is nervous/anxious.    All other systems reviewed and are negative.      SOCIAL HX  Social History     Socioeconomic History   • Marital status:      Spouse name: Not on file   • Number of children: Not on file   • Years of education: Not on file   • Highest education level: Not on file   Tobacco Use   • Smoking status: Never Smoker   • Smokeless tobacco: Never Used   Substance and Sexual Activity   • Alcohol use: Yes     Alcohol/week: 1.0 standard drinks     Types: 1 Glasses of wine per week     Comment: Occassionally (3-4 DRINKS/WEEK)   • Drug use: No   • Sexual activity: Defer       FAMILY HX  Family History   Problem Relation Age of Onset   • Diabetes Father    • Stroke Father    • Heart disease Sister    • Heart disease Brother        OBJECTIVE:  Vitals:    10/13/20 0749 10/13/20 0751   BP: 126/79 117/71   BP Location: Right arm Left arm   Patient Position: Lying Lying   Pulse: 74    Resp: 16    Temp: 98.5 °F  "(36.9 °C)    TempSrc: Tympanic    SpO2: 99%    Weight: 54.6 kg (120 lb 5.9 oz)    Height: 165.1 cm (65\")      No intake/output data recorded.  No intake/output data recorded.  Intake & Output (last 3 days)     None           PHYSICAL EXAMINATION:  Vitals signs and nursing note reviewed.   Constitutional:       General: Not in acute distress.     Appearance: Well-developed and not in distress.   Eyes:      Conjunctiva/sclera: Conjunctivae normal.   HENT:      Head: Normocephalic and atraumatic.      Right Ear: External ear normal.      Left Ear: External ear normal.      Nose: Nose normal.   Neck:      Musculoskeletal: Neck supple.      Thyroid: No thyromegaly.      Vascular: No carotid bruit or JVD.   Pulmonary:      Effort: Pulmonary effort is normal. No respiratory distress.      Breath sounds: Normal breath sounds. No decreased breath sounds. No wheezing. No rhonchi. No rales.   Cardiovascular:      Normal rate. Regular rhythm. Normal S1. Normal S2.      Murmurs: There is no murmur.      No gallop. No click. No rub.   Pulses:     No decreased pulses.   Edema:     Peripheral edema absent.   Abdominal:      General: Bowel sounds are normal.      Palpations: Abdomen is soft.      Tenderness: There is no abdominal tenderness.   Musculoskeletal: Normal range of motion.   Skin:     General: Skin is warm and dry.      Findings: No erythema.   Neurological:      Mental Status: Alert and oriented to person, place, and time.      Comments: No focal deficits.   Psychiatric:         Thought Content: Thought content normal.         Diagnostic Data:  Lab Results (last 24 hours)     Procedure Component Value Units Date/Time    CBC (No Diff) [[id number]]  (Normal) Collected: 10/13/20 0800    Specimen: Blood Updated: 10/13/20 0826     WBC 5.67 10*3/mm3      RBC 4.43 10*6/mm3      Hemoglobin 13.3 g/dL      Hematocrit 41.7 %      MCV 94.1 fL      MCH 30.0 pg      MCHC 31.9 g/dL      RDW 13.3 %      RDW-SD 45.8 fl      MPV 9.8 fL     "  Platelets 285 10*3/mm3     Basic Metabolic Panel [[id number]] Collected: 10/13/20 0800    Specimen: Blood Updated: 10/13/20 0814    Lipid Panel [[id number]] Collected: 10/13/20 0800    Specimen: Blood Updated: 10/13/20 0814        ECG/EMG Results (last 24 hours)     ** No results found for the last 24 hours. **               Elevated coronary artery calcium score        ASSESSMENT/PLAN:  CP  - CT calcium score stopped mid test d/t extensive LAD involvement, calcium score 225.4, C recommended.   - plan for [facility] +/- CBI.  Risks, benefits, alternatives were discussed with the patient and she is agreeable to proceed.    HLD  - likely familial,  6/2020)  -Long discussion with the patient regarding the plaque rupture prevention, plaque stabilization, and reduction in cardiac risk properties of statins.  Emphasized the importance of trying rosuvastatin.  Discussed that she if she has side effects on this we could try an alternate agent.  May also consider PCSK9i.    Family Hx Premature CAD          Electronically signed by Carmita Flores PA-C, 10/13/20, 8:34 AM EDT.       The risks, benefits, and alternative options of cardiac catheterization were discussed with the patient.  The risks include death, MI, stroke, infection, vascular injury requiring surgical repair and/or blood transfusion, coronary dissection, renal dysfunction/failure, allergic reaction, emergent CABG.  If PCI is needed there is a 1-2% risk of emergent CABG.  The patient is agreeable for cardiac catheterization, possible PCI or CABG. Plan is to proceed with cardiac catheterization and possible PCI.     Prince Mccollum M.D., F.A.C.C.  Interventional Cardiology  10/13/20  09:25 EDT

## 2020-10-14 ENCOUNTER — TELEPHONE (OUTPATIENT)
Dept: CARDIOLOGY | Facility: CLINIC | Age: 70
End: 2020-10-14

## 2020-10-14 LAB — CREAT BLDA-MCNC: 0.8 MG/DL (ref 0.6–1.3)

## 2020-10-14 NOTE — TELEPHONE ENCOUNTER
Pt called today with concerns of starting metoprolol 12.5 mg BID with her Raynauds syndrome. I advised this beta block is less likely to cause vasoconstriction. She states she is going to give it a try and if she notices any negative symptoms she will calls us.

## 2020-11-03 RX ORDER — PRAVASTATIN SODIUM 40 MG
40 TABLET ORAL NIGHTLY
Qty: 30 TABLET | Refills: 1 | Status: SHIPPED | OUTPATIENT
Start: 2020-11-03 | End: 2020-11-17 | Stop reason: SINTOL

## 2020-11-03 NOTE — TELEPHONE ENCOUNTER
Pt called about side effects of rosuvastatin- knee and joint pain and a UTI. She stopped it Thursday and symptoms have decreased. Per RDS office note on 8/24/20 we will try pravastatin next before Repatha. RX sent to [facility] per pt request.    Pt would also like a referral to urology for chronic UTIs. Spoke with [facility] faxing over records to [phone] they will call her with an appt and fax one back to me for our records.

## 2020-11-17 RX ORDER — EVOLOCUMAB 420 MG/3.5
420 KIT SUBCUTANEOUS
Qty: 1 CARTRIDGE | Refills: 11 | Status: SHIPPED | OUTPATIENT
Start: 2020-11-17 | End: 2020-11-18 | Stop reason: SDUPTHER

## 2020-11-17 NOTE — TELEPHONE ENCOUNTER
Pt calls to report intolerance to pravastatin due to painful urination again. She stopped pravastatin on Friday and symptoms resolved. This was the last statin therapy to try prior to Repatha. We will send in Repatha Pushtronex. [facility] for pt stating this will require a PA and we will supply a sample in hopes PA is approved. Will await pt return call if further questions.

## 2020-11-18 RX ORDER — EVOLOCUMAB 420 MG/3.5
420 KIT SUBCUTANEOUS
Qty: 1 CARTRIDGE | Refills: 11 | Status: SHIPPED | OUTPATIENT
Start: 2020-11-18 | End: 2020-11-30

## 2020-11-18 NOTE — TELEPHONE ENCOUNTER
PA for Repatha approved. (Key: [id number])  Rx #: [id number]  Repatha Pushtronex System 420MG/3.5ML on-body infusor    179.02 through [employer]- trying to send to [facility] to see if cheaper.

## 2020-11-30 RX ORDER — BEMPEDOIC ACID AND EZETIMIBE 180; 10 MG/1; MG/1
180 TABLET, FILM COATED ORAL DAILY
Qty: 90 TABLET | Refills: 3 | Status: SHIPPED | OUTPATIENT
Start: 2020-11-30 | End: 2022-05-09

## 2020-11-30 NOTE — TELEPHONE ENCOUNTER
After Repatha approved it is still $180/month. Pt cannot afford this. We are going to try Nexlizet as the patient was not comfortable with an injectable to begin with and this may be cheaper if PA is approved. One month of samples given until PA decision.

## 2020-12-03 ENCOUNTER — TELEPHONE (OUTPATIENT)
Dept: CARDIOLOGY | Facility: CLINIC | Age: 70
End: 2020-12-03

## 2020-12-03 NOTE — TELEPHONE ENCOUNTER
Nexlizet PA was approved with [facility]- PA being scanned in. Spoke with [employer] verified price was $20.00 for a 90-day supply. Pt notified.

## 2020-12-10 NOTE — PROGRESS NOTES
OFFICE VISIT  NOTE  [facility] CARDIOLOGY      Name: Clare Peace    Date: 2020  MRN:  [id number]  :  1950      REFERRING/PRIMARY PROVIDER:  Angela Keating APRN    Chief Complaint   Patient presents with   • Coronary artery disease of native artery of native heart wit       HPI: Clare Peace is a 70 y.o. female who presents today for CAD and hyperlipidemia.  Cardiac catheterization 10/13/2022 showed tandem mid to distal LAD 50 to 60% lesions, nonsignificant by IFR pullback.  She has a strong family history of premature CAD, her brother  at age 58 from a myocardial infarction, sister  at age 64 after CABG, father  of strokes in 50s, she has lifelong hypercholesterolemia as well as her whole family.  Intolerant to statins due to myalgia, has tried for in the past.  Repatha was too expensive therefore Nexilezet was prescribed, started it two days ago.  No side effects thus far.  No further chest pain.    Past Medical History:   Diagnosis Date   • Osteoarthritis    • PONV (postoperative nausea and vomiting)        Past Surgical History:   Procedure Laterality Date   • CARDIAC CATHETERIZATION N/A 10/13/2020    Procedure: Left Heart Cath;  Surgeon: Prince Mccollum MD;  Location:  [facility] C[facility]VAS[facility];  Service: Cardiology;  Laterality: N/A;   • CARDIAC CATHETERIZATION  10/13/2020    Procedure: Functional Flow [place];  Surgeon: Prince Mccollum MD;  Location:  [facility] CATH INVASIVE LOCATION;  Service: Cardiology;;   •  SECTION     • HYSTERECTOMY         Social History     Socioeconomic History   • Marital status:      Spouse name: Not on file   • Number of children: Not on file   • Years of education: Not on file   • Highest education level: Not on file   Tobacco Use   • Smoking status: Never Smoker   • Smokeless tobacco: Never Used   Substance and Sexual Activity   • Alcohol use: Yes     Alcohol/week: 1.0 standard drinks     Types: 1  "Glasses of wine per week     Comment: Occassionally (3-4 DRINKS/WEEK)   • Drug use: No   • Sexual activity: Defer       Family History   Problem Relation Age of Onset   • Diabetes Father    • Stroke Father    • Heart disease Sister    • Heart disease Brother         ROS:   Constitutional no fever,  no weight loss   Skin no rash, no subcutaneous nodules   Otolaryngeal no difficulty swallowing   Cardiovascular See HPI   Pulmonary no cough, no sputum production   Gastrointestinal no constipation, no diarrhea   Genitourinary no dysuria, no hematuria   Hematologic no easy bruisability, no abnormal bleeding   Musculoskeletal no muscle pain   Neurologic no dizziness, no falls         Allergies   Allergen Reactions   • Codeine GI Intolerance         Current Outpatient Medications:   •  aspirin (aspirin) 81 MG EC tablet, Take 1 tablet by mouth Daily., Disp: 90 tablet, Rfl: 3  •  Bempedoic Acid-Ezetimibe (Nexlizet) 180-10 MG tablet, Take 180 mg by mouth Daily., Disp: 90 tablet, Rfl: 3  •  cholecalciferol (VITAMIN D3) 25 MCG (1000 UT) tablet, Take 1,000 Units by mouth Daily., Disp: , Rfl:   •  glucosamine-chondroitin 500-400 MG capsule capsule, Take 1 capsule by mouth 2 (Two) Times a Day With Meals., Disp: , Rfl:   •  traZODone (DESYREL) 50 MG tablet, Take 100 mg by mouth At Night As Needed., Disp: , Rfl: 3  •  Turmeric 500 MG tablet, Take 1 tablet by mouth 2 (two) times a day., Disp: , Rfl:   •  nitroglycerin (NITROSTAT) 0.4 MG SL tablet, 1 under the tongue as needed for angina, may repeat q5mins for up three doses, Disp: 25 tablet, Rfl: 11    Vitals:    12/14/20 1548   BP: 120/80   BP Location: Right arm   Patient Position: Sitting   Pulse: 68   SpO2: 93%   Weight: 54 kg (119 lb)   Height: 165.1 cm (65\")     Body mass index is 19.8 kg/m².    PHYSICAL EXAM:    General Appearance:   · well developed  · well nourished  HENT:   · oropharynx moist  · lips not cyanotic  Neck:  · thyroid not enlarged  · supple  Respiratory:  · no " respiratory distress  · normal breath sounds  · no rales  Cardiovascular:  · no jugular venous distention  · regular rhythm  · apical impulse normal  · S1 normal, S2 normal  · no S3, no S4   · no murmur  · no rub, no thrill  · carotid pulses normal; no bruit  · pedal pulses normal  · lower extremity edema: none    Gastrointestinal:   · bowel sounds normal  · non-tender  · no hepatomegaly, no splenomegaly  Musculoskeletal:  · no clubbing of fingers.   · normocephalic, head atraumatic  Skin:   · warm, dry  Psychiatric:  · judgement and insight appropriate  · normal mood and affect    RESULTS:   Procedures           Labs:  CBC 07/17/20   WBC 27.4   RBC 4.72   Hemoglobin 14.4   Hematocrit 44.8   Platelet 299       CMP  07/17/20   Glucose 89   BUN 22   Creatinine 0.82   Glomerular Filtration Rate 71   BUN/Creatinine Ratio NA   Sodium 139   Potassium 4.3   Carbon Dioxide 18 L   Calcium 9.4   AST 19   ALT 18       LIPID PANEL 07/17/20   Total 325 H   Triglycerides 83       H       TSH 1.12         ASSESSMENT:  Problem List Items Addressed This Visit        Cardiovascular and Mediastinum    Hyperlipidemia LDL goal <100    Elevated coronary artery calcium score    Overview     CT cardiac calcium scoring to detect 10 distinct calcified  plaque lesions including 4 within the LAD and 6 within the right  coronary artery totaling a calcium volume 194.9 cu mm with calcium score  calculated at 225.4 placing patient in the moderate calcification  category for patient demographic.         Relevant Medications    nitroglycerin (NITROSTAT) 0.4 MG SL tablet    Coronary artery disease of native artery of native heart with stable angina pectoris (CMS/[facility]) - Primary    Overview     10/13/2020: Cardiac catheterization, LAD mid heavily calcified 50-60% stenosis at the bifurcation of the first diagonal, followed by a 40-50% of the second diagonal bifurcation and a 50 to 60% in the distal LAD, IFR mildly positive at 0.87.  RCA  20-30% diffuse stenosis.  Small nondominant left circumflex, without significant disease.,  LVEDP 4 mmHg.  Medical management as the patient has relatively mild symptoms, and intervention of the LAD will require rotational atherectomy.         Relevant Medications    nitroglycerin (NITROSTAT) 0.4 MG SL tablet       Nervous and Auditory    Chest pain    Overview     01/31/18 Treadmill echo  · Good exercise tolerance  · No ischemic EKG changes  · No obvious regional wall motion abnormalities                PLAN:    1.  CAD:  Cath 10/20, showed moderate LAD disease  Aggressive risk factor modification with lipid lowering therapy, aspirin  Prescribed as needed nitroglycerin    Continue exercise and diet    The patient was advised to call 911 if chest pain worsens or persist despite nitroglycerin or rest.    2.  Severe hypercholesterolemia:  Likely familial given the severity of her lipids  Lifetime ASCVD risk 39%  Patient was intolerant to statin therapy due to myalgia and recurrent UTI's.   Repatha unfortunately was not affordable. We were able to get Nexlizet 180-10 mg daily approved for her.     Repeat lipids in 3 months when she returns from [place]    3.  Family history of premature CAD:  Coronary CTA calcium score 225.4.         Return to clinic in 6 months, or sooner as needed.    Thank you for the opportunity to share in the care of your patient; please do not hesitate to call me with any questions.     Prince Mccollum MD, [facility]  Office: [phone]  [facility] [place] [address]    12/14/20

## 2020-12-14 ENCOUNTER — OFFICE VISIT (OUTPATIENT)
Dept: CARDIOLOGY | Facility: CLINIC | Age: 70
End: 2020-12-14

## 2020-12-14 VITALS
SYSTOLIC BLOOD PRESSURE: 120 MMHG | BODY MASS INDEX: 19.83 KG/M2 | HEART RATE: 68 BPM | OXYGEN SATURATION: 93 % | DIASTOLIC BLOOD PRESSURE: 80 MMHG | WEIGHT: 119 LBS | HEIGHT: 65 IN

## 2020-12-14 DIAGNOSIS — E78.5 HYPERLIPIDEMIA LDL GOAL <100: ICD-10-CM

## 2020-12-14 DIAGNOSIS — R07.9 CHEST PAIN, UNSPECIFIED TYPE: ICD-10-CM

## 2020-12-14 DIAGNOSIS — I25.118 CORONARY ARTERY DISEASE OF NATIVE ARTERY OF NATIVE HEART WITH STABLE ANGINA PECTORIS (HCC): Primary | ICD-10-CM

## 2020-12-14 DIAGNOSIS — R93.1 ELEVATED CORONARY ARTERY CALCIUM SCORE: ICD-10-CM

## 2020-12-14 PROCEDURE — 99214 OFFICE O/P EST MOD 30 MIN: CPT | Performed by: INTERNAL MEDICINE

## 2020-12-14 RX ORDER — NITROGLYCERIN 0.4 MG/1
TABLET SUBLINGUAL
Qty: 25 TABLET | Refills: 11 | Status: SHIPPED | OUTPATIENT
Start: 2020-12-14 | End: 2022-10-31 | Stop reason: SDUPTHER

## 2021-03-17 ENCOUNTER — OFFICE VISIT (OUTPATIENT)
Dept: ORTHOPEDIC SURGERY | Facility: CLINIC | Age: 71
End: 2021-03-17

## 2021-03-17 VITALS
HEART RATE: 75 BPM | BODY MASS INDEX: 19.56 KG/M2 | WEIGHT: 117.4 LBS | HEIGHT: 65 IN | SYSTOLIC BLOOD PRESSURE: 113 MMHG | DIASTOLIC BLOOD PRESSURE: 73 MMHG

## 2021-03-17 DIAGNOSIS — M77.42 METATARSALGIA OF LEFT FOOT: Primary | ICD-10-CM

## 2021-03-17 DIAGNOSIS — M79.671 BILATERAL FOOT PAIN: ICD-10-CM

## 2021-03-17 DIAGNOSIS — M05.79 RHEUMATOID ARTHRITIS INVOLVING MULTIPLE SITES WITH POSITIVE RHEUMATOID FACTOR (HCC): ICD-10-CM

## 2021-03-17 DIAGNOSIS — M79.672 BILATERAL FOOT PAIN: ICD-10-CM

## 2021-03-17 PROCEDURE — 99213 OFFICE O/P EST LOW 20 MIN: CPT | Performed by: ORTHOPAEDIC SURGERY

## 2021-03-17 RX ORDER — FUROSEMIDE 40 MG/1
40 TABLET ORAL AS NEEDED
COMMUNITY

## 2021-03-17 NOTE — PROGRESS NOTES
ESTABLISHED PATIENT    Patient: Clare Peace  : 1950    Primary Care Provider: Angela Keating APRN    Requesting Provider: As above    Pain of the Right Foot and Follow-up of the Left Foot (Metatarsalgia of left foot)      History    Chief Complaint: Bilateral foot pain, right worse than left    History of Present Illness: She returns for follow-up of her bilateral hallux rigidus and metatarsalgia, metatarsalgia is improved but the right great toe is bothering her.  She had a steroid injection in the right great toe in January while she was in [place] and that helped.  She does not find the custom orthotics very helpful, she is wearing over-the-counter power steps.  I encouraged her to try over-the-counter turf toe orthotics and explained how to find them on [employer].  She has been thinking about surgery, she is not certain that she is ready.  The second third and fourth toes on the right foot are angling a bit more towards the great toe.  She does have a history of rheumatoid arthritis, is not currently seeing a rheumatologist    Current Outpatient Medications on File Prior to Visit   Medication Sig Dispense Refill   • aspirin (aspirin) 81 MG EC tablet Take 1 tablet by mouth Daily. 90 tablet 3   • Bempedoic Acid-Ezetimibe (Nexlizet) 180-10 MG tablet Take 180 mg by mouth Daily. 90 tablet 3   • cholecalciferol (VITAMIN D3) 25 MCG (1000 UT) tablet Take 1,000 Units by mouth Daily.     • furosemide (LASIX) 40 MG tablet As Needed.     • glucosamine-chondroitin 500-400 MG capsule capsule Take 1 capsule by mouth 2 (Two) Times a Day With Meals.     • nitroglycerin (NITROSTAT) 0.4 MG SL tablet 1 under the tongue as needed for angina, may repeat q5mins for up three doses 25 tablet 11   • traZODone (DESYREL) 50 MG tablet Take 100 mg by mouth At Night As Needed.  3   • Turmeric 500 MG tablet Take 1 tablet by mouth 2 (two) times a day.       No current facility-administered medications on file prior to visit.       Allergies   Allergen Reactions   • Codeine GI Intolerance      Past Medical History:   Diagnosis Date   • Osteoarthritis    • PONV (postoperative nausea and vomiting)      Past Surgical History:   Procedure Laterality Date   • CARDIAC CATHETERIZATION N/A 10/13/2020    Procedure: Left Heart Cath;  Surgeon: Prince Mccollum MD;  Location:  [facility] CATH INVASIVE LOCATION;  Service: Cardiology;  Laterality: N/A;   • CARDIAC CATHETERIZATION  10/13/2020    Procedure: Functional Flow [place];  Surgeon: Prince Mccollum MD;  Location:  [facility] CATH INVASIVE LOCATION;  Service: Cardiology;;   •  SECTION     • HYSTERECTOMY       Family History   Problem Relation Age of Onset   • Diabetes Father    • Stroke Father    • Heart disease Sister    • Heart disease Brother       Social History     Socioeconomic History   • Marital status:      Spouse name: Not on file   • Number of children: Not on file   • Years of education: Not on file   • Highest education level: Not on file   Tobacco Use   • Smoking status: Never Smoker   • Smokeless tobacco: Never Used   Substance and Sexual Activity   • Alcohol use: Yes     Alcohol/week: 1.0 standard drinks     Types: 1 Glasses of wine per week     Comment: Occassionally (3-4 DRINKS/WEEK)   • Drug use: No   • Sexual activity: Defer        Review of Systems   Constitutional: Negative.    HENT: Negative.    Eyes: Negative.    Respiratory: Negative.    Cardiovascular: Negative.    Gastrointestinal: Negative.    Endocrine: Negative.    Genitourinary: Negative.    Musculoskeletal: Positive for arthralgias.   Skin: Negative.    Allergic/Immunologic: Negative.    Neurological: Negative.    Hematological: Negative.    Psychiatric/Behavioral: Negative.        The following portions of the patient's history were reviewed and updated as appropriate: allergies, current medications, past family history, past medical history, past social history, past surgical history and problem  "list.    Physical Exam:   /73   Pulse 75   Ht 165.1 cm (65\")   Wt 53.3 kg (117 lb 6.4 oz)   BMI 19.54 kg/m²   GENERAL: Body habitus: normal weight for height    Lower extremity edema: Left: none; Right: none    Gait: normal     Mental Status:  awake and alert; oriented to person, place, and time  MSK:  Tibia:  Right:  non tender; Left:  non tender        Ankle:  Right: non tender; Left:  non tender        Foot:  Right:  Mildly tender first MTPJ, flexible medial angulation of toes 2, 3, 4; Left:  Similar osteophytes first MTPJ but not tender, similar small toe angulation    NEURO Sensation:  intact    Medical Decision Making    Data Review:   ordered and reviewed x-rays today    Assessment/Plan/Diagnosis/Treatment Options:   1. Metatarsalgia of left foot  The metatarsalgia is improved    2. Bilateral foot pain  Bilateral hallux rigidus with medial angulation of the small toes.  If it came to surgery on the right foot I would do a fusion of the first MTPJ, and correct the second third and fourth toes with PIP joint excisions and tenotomy's.  She is not certain that she is ready for such a big procedure.  As above I encouraged her to try the over-the-counter turf toe inserts.  I will be happy to see her anytime.  If she wants another injection she needs to wait at least 4 months from the prior  - XR Foot 2 View Bilateral    3. Rheumatoid arthritis involving multiple sites with positive rheumatoid factor (CMS/HCC)  No obvious synovitis today        Joyce Fair MD                      "

## 2021-04-01 ENCOUNTER — APPOINTMENT (RX ONLY)
Dept: URBAN - METROPOLITAN AREA CLINIC 116 | Facility: CLINIC | Age: 71
Setting detail: DERMATOLOGY
End: 2021-04-01

## 2021-04-01 DIAGNOSIS — Z41.9 ENCOUNTER FOR PROCEDURE FOR PURPOSES OTHER THAN REMEDYING HEALTH STATE, UNSPECIFIED: ICD-10-CM

## 2021-04-01 PROCEDURE — ? COSMETIC CONSULTATION: SKIN TIGHTENING

## 2021-04-01 PROCEDURE — ? COSMETIC CONSULTATION: SKIN CARE PRODUCTS AND SERVICES

## 2021-04-01 PROCEDURE — ? COSMETIC CONSULTATION: FILLERS

## 2021-04-01 PROCEDURE — ? MEDICAL CONSULTATION: BOTOX

## 2021-04-01 ASSESSMENT — LOCATION ZONE DERM: LOCATION ZONE: FACE

## 2021-04-01 ASSESSMENT — LOCATION DETAILED DESCRIPTION DERM
LOCATION DETAILED: RIGHT INFERIOR MEDIAL MALAR CHEEK
LOCATION DETAILED: RIGHT INFERIOR MEDIAL FOREHEAD
LOCATION DETAILED: LEFT INFERIOR CENTRAL MALAR CHEEK

## 2021-04-01 ASSESSMENT — LOCATION SIMPLE DESCRIPTION DERM
LOCATION SIMPLE: LEFT CHEEK
LOCATION SIMPLE: RIGHT FOREHEAD
LOCATION SIMPLE: RIGHT CHEEK

## 2021-04-12 ENCOUNTER — APPOINTMENT (RX ONLY)
Dept: URBAN - METROPOLITAN AREA CLINIC 116 | Facility: CLINIC | Age: 71
Setting detail: DERMATOLOGY
End: 2021-04-12

## 2021-04-12 VITALS — TEMPERATURE: 98 F

## 2021-04-12 DIAGNOSIS — D485 NEOPLASM OF UNCERTAIN BEHAVIOR OF SKIN: ICD-10-CM

## 2021-04-12 DIAGNOSIS — Z41.9 ENCOUNTER FOR PROCEDURE FOR PURPOSES OTHER THAN REMEDYING HEALTH STATE, UNSPECIFIED: ICD-10-CM

## 2021-04-12 PROBLEM — D48.5 NEOPLASM OF UNCERTAIN BEHAVIOR OF SKIN: Status: ACTIVE | Noted: 2021-04-12

## 2021-04-12 PROCEDURE — ? BOTOX (U OR CC)

## 2021-04-12 PROCEDURE — 11102 TANGNTL BX SKIN SINGLE LES: CPT

## 2021-04-12 PROCEDURE — ? BIOPSY BY SHAVE METHOD

## 2021-04-12 ASSESSMENT — LOCATION DETAILED DESCRIPTION DERM
LOCATION DETAILED: INFERIOR MID FOREHEAD
LOCATION DETAILED: LEFT MEDIAL EYEBROW
LOCATION DETAILED: RIGHT INFERIOR TEMPLE
LOCATION DETAILED: SUPERIOR MID FOREHEAD
LOCATION DETAILED: LEFT FOREHEAD
LOCATION DETAILED: GLABELLA
LOCATION DETAILED: RIGHT SUPERIOR FOREHEAD
LOCATION DETAILED: RIGHT FOREHEAD
LOCATION DETAILED: LEFT SUPERIOR FOREHEAD
LOCATION DETAILED: LEFT INFERIOR TEMPLE
LOCATION DETAILED: RIGHT MEDIAL EYEBROW
LOCATION DETAILED: RIGHT ANTERIOR SHOULDER

## 2021-04-12 ASSESSMENT — LOCATION SIMPLE DESCRIPTION DERM
LOCATION SIMPLE: RIGHT FOREHEAD
LOCATION SIMPLE: LEFT EYEBROW
LOCATION SIMPLE: INFERIOR FOREHEAD
LOCATION SIMPLE: RIGHT SHOULDER
LOCATION SIMPLE: SUPERIOR FOREHEAD
LOCATION SIMPLE: GLABELLA
LOCATION SIMPLE: LEFT TEMPLE
LOCATION SIMPLE: RIGHT TEMPLE
LOCATION SIMPLE: RIGHT EYEBROW
LOCATION SIMPLE: LEFT FOREHEAD

## 2021-04-12 ASSESSMENT — LOCATION ZONE DERM
LOCATION ZONE: FACE
LOCATION ZONE: ARM

## 2021-04-12 NOTE — PROCEDURE: BOTOX (U OR CC)
Reconstitution Date (Optional): 4/12/2021
Lot #: [id number]
Measure In Units Or Cc's?: units
Consent: Written consent obtained. Risks include but not limited to lid/brow ptosis, bruising, swelling, diplopia, temporary effect, incomplete chemical denervation.
Price Per Unit Or Per Cc In $ (Use Numbers Only, No Special Characters Or $): 15
Post-Care Instructions: Patient instructed to not lie down for 4 hours and limit physical activity for 24 hours. Patient instructed not to travel by airplane for 48 hours.
Dilution (U/0.1 Cc): 1.1
Detail Level: Zone
Expiration Date (Month Year): 12/2023
Quantity Per Injection Site (Units): 8
Quantity Per Injection Site (Units): 6
Quantity Per Injection Site (Units): 10
Quantity Per Injection Site (Units): 3

## 2021-04-12 NOTE — PROCEDURE: BIOPSY BY SHAVE METHOD
Detail Level: Simple
Depth Of Biopsy: dermis
Was A Bandage Applied: Yes
Size Of Lesion In Cm: 0
Biopsy Type: H and E
Biopsy Method: Personna blade
Anesthesia Type: 1% lidocaine with epinephrine and a 1:10 solution of 8.4% sodium bicarbonate
Anesthesia Volume In Cc (Will Not Render If 0): 1
Hemostasis: Electrocautery
Wound Care: Vaseline
Dressing: pressure dressing with telfa
Destruction After The Procedure: No
Type Of Destruction Used: Curettage
Curettage Text: The wound bed was treated with curettage after the biopsy was performed.
Cryotherapy Text: The wound bed was treated with cryotherapy after the biopsy was performed.
Electrodesiccation Text: The wound bed was treated with electrodesiccation after the biopsy was performed.
Electrodesiccation And Curettage Text: The wound bed was treated with electrodesiccation and curettage after the biopsy was performed.
Silver Nitrate Text: The wound bed was treated with silver nitrate after the biopsy was performed.
Lab: [facility]
Lab Facility: [address] [facility]
Consent: Written consent was obtained and risks were reviewed including but not limited to scarring, infection, bleeding, scabbing, incomplete removal, nerve damage and allergy to anesthesia.
Post-Care Instructions: I reviewed with the patient in detail post-care instructions. Patient is to keep the biopsy site dry overnight, and then apply bacitracin twice daily until healed. Patient may apply hydrogen peroxide soaks to remove any crusting.
Notification Instructions: Patient will be notified of biopsy results. However, patient instructed to call the office if not contacted within 2 weeks.
Billing Type: [employer]
Information: Selecting Yes will display possible errors in your note based on the variables you have selected. This validation is only offered as a suggestion for you. PLEASE NOTE THAT THE VALIDATION TEXT WILL BE REMOVED WHEN YOU FINALIZE YOUR NOTE. IF YOU WANT TO FAX A PRELIMINARY NOTE YOU WILL NEED TO TOGGLE THIS TO 'NO' IF YOU DO NOT WANT IT IN YOUR FAXED NOTE.

## 2021-05-05 ENCOUNTER — APPOINTMENT (RX ONLY)
Dept: URBAN - METROPOLITAN AREA CLINIC 116 | Facility: CLINIC | Age: 71
Setting detail: DERMATOLOGY
End: 2021-05-05

## 2021-05-05 DIAGNOSIS — L57.0 ACTINIC KERATOSIS: ICD-10-CM

## 2021-05-05 DIAGNOSIS — Z41.9 ENCOUNTER FOR PROCEDURE FOR PURPOSES OTHER THAN REMEDYING HEALTH STATE, UNSPECIFIED: ICD-10-CM

## 2021-05-05 PROCEDURE — ? LIQUID NITROGEN

## 2021-05-05 PROCEDURE — ? COUNSELING

## 2021-05-05 PROCEDURE — 17000 DESTRUCT PREMALG LESION: CPT

## 2021-05-05 PROCEDURE — ? PLATELET RICH PLASMA INJECTION

## 2021-05-05 ASSESSMENT — LOCATION DETAILED DESCRIPTION DERM
LOCATION DETAILED: LEFT CHIN
LOCATION DETAILED: LEFT INFERIOR MEDIAL MALAR CHEEK
LOCATION DETAILED: RIGHT INFERIOR CENTRAL MALAR CHEEK
LOCATION DETAILED: RIGHT INFERIOR MEDIAL MALAR CHEEK
LOCATION DETAILED: RIGHT ANTERIOR SHOULDER
LOCATION DETAILED: LEFT INFERIOR CENTRAL MALAR CHEEK

## 2021-05-05 ASSESSMENT — LOCATION ZONE DERM
LOCATION ZONE: ARM
LOCATION ZONE: FACE

## 2021-05-05 ASSESSMENT — LOCATION SIMPLE DESCRIPTION DERM
LOCATION SIMPLE: CHIN
LOCATION SIMPLE: LEFT CHEEK
LOCATION SIMPLE: RIGHT CHEEK
LOCATION SIMPLE: RIGHT SHOULDER

## 2021-05-05 NOTE — PROCEDURE: MIPS QUALITY
Detail Level: Simple
Quality 130: Documentation Of Current Medications In The Medical Record: Documentation of a medical reason(s) for not documenting, updating, or reviewing the patientâs current medications list (e.g., patient is in an urgent or emergent medical situation)
Additional Notes: Medication list not current

## 2021-05-05 NOTE — PROCEDURE: LIQUID NITROGEN
Render Post-Care Instructions In Note?: yes
Detail Level: Zone
Render Note In Bullet Format When Appropriate: No
Number Of Freeze-Thaw Cycles: 2 freeze-thaw cycles
Duration Of Freeze Thaw-Cycle (Seconds): 3
Post-Care Instructions: I reviewed with the patient in detail post-care instructions. Patient is to wear sunprotection, and avoid picking at any of the treated lesions. Pt may apply Vaseline to crusted or scabbing areas.
Consent: The patient's consent was obtained including but not limited to risks of crusting, scabbing, blistering, scarring, darker or lighter pigmentary change, recurrence, incomplete removal and infection.

## 2021-05-05 NOTE — PROCEDURE: PLATELET RICH PLASMA INJECTION
Depth In Mm (Will Not Render If 0): 0
Price (Use Numbers Only, No Special Characters Or $): 098 [facility] [place] [facility]
Which Technique?: Default
Consent: Written consent obtained, risks reviewed including but not limited to pain, scarring, infection and incomplete improvement. Patient understands the procedure is cosmetic in nature and will require out of pocket payment.
Show Additional Techniques: Yes
Post-Care Instructions: After the procedure, take precautions agains sun exposure. Do not apply sunscreen for 12 hours after the procedure. Do not apply make-up for 12 hours after the procedure. Avoid alcohol based toners for 10-14 days. After 2-3 days patients can return to their regular skin regimen.
Detail Level: Zone

## 2021-06-01 ENCOUNTER — TELEPHONE (OUTPATIENT)
Dept: CARDIOLOGY | Facility: CLINIC | Age: 71
End: 2021-06-01

## 2021-06-01 NOTE — TELEPHONE ENCOUNTER
I spoke with pt regarding lipid panel she notes her LDL is 190 up from 118. She reports she has not been taking her Nexlizet due to being in [place] for the season and forgot it. She is going to start back today.     She also reports she is f/u with a cardiologist in [place] on 6/21 to establish care there while she is visiting. Advised her to bring her current lab work to new cardiologist and continue Nexlizet. She will call if any changes.

## 2021-07-12 ENCOUNTER — OFFICE VISIT (OUTPATIENT)
Dept: CARDIOLOGY | Facility: CLINIC | Age: 71
End: 2021-07-12

## 2021-07-12 VITALS
HEART RATE: 62 BPM | HEIGHT: 65 IN | BODY MASS INDEX: 18.96 KG/M2 | WEIGHT: 113.8 LBS | DIASTOLIC BLOOD PRESSURE: 60 MMHG | SYSTOLIC BLOOD PRESSURE: 120 MMHG

## 2021-07-12 DIAGNOSIS — I25.118 CORONARY ARTERY DISEASE OF NATIVE ARTERY OF NATIVE HEART WITH STABLE ANGINA PECTORIS (HCC): Primary | ICD-10-CM

## 2021-07-12 DIAGNOSIS — R93.1 ELEVATED CORONARY ARTERY CALCIUM SCORE: ICD-10-CM

## 2021-07-12 DIAGNOSIS — R07.9 CHEST PAIN, UNSPECIFIED TYPE: ICD-10-CM

## 2021-07-12 DIAGNOSIS — E78.5 HYPERLIPIDEMIA LDL GOAL <100: ICD-10-CM

## 2021-07-12 DIAGNOSIS — R06.02 SOB (SHORTNESS OF BREATH) ON EXERTION: ICD-10-CM

## 2021-07-12 PROCEDURE — 99214 OFFICE O/P EST MOD 30 MIN: CPT | Performed by: INTERNAL MEDICINE

## 2021-07-12 NOTE — PROGRESS NOTES
OFFICE VISIT  NOTE  [facility] CARDIOLOGY      Name: Clare Peace    Date: 2021  MRN:  [id number]  :  1950      REFERRING/PRIMARY PROVIDER:  Angela Keating APRN    Chief Complaint   Patient presents with   • Coronary Artery Disease       HPI: Clare Peace is a 70 y.o. female who presents today for CAD and hyperlipidemia.  Cardiac catheterization 10/13/20 showed tandem mid to distal LAD 50 to 60% lesions, nonsignificant by IFR pullback.  She has a strong family history of premature CAD, her brother  at age 58 from a myocardial infarction, sister  at age 64 after CABG, father  of strokes in 50s, she has lifelong hypercholesterolemia as well as her whole family.  Intolerant to statins due to myalgia, has tried for in the past.  Repatha was too expensive therefore Nexilezet was prescribed, however she experienced similar symptoms to statins with muscle ache, fatigue, lack of energy.  Still working out by doing water aerobics 8 hours/week, no chest pain or shortness of breath during these activities.  She gets occasional short of breath after coughing, or short of breath at rest, lasting few seconds, she thinks it may be related to panic attacks.    Past Medical History:   Diagnosis Date   • Osteoarthritis    • PONV (postoperative nausea and vomiting)        Past Surgical History:   Procedure Laterality Date   • CARDIAC CATHETERIZATION N/A 10/13/2020    Procedure: Left Heart Cath;  Surgeon: Prince Mccollum MD;  Location:  [facility];  Service: Cardiology;  Laterality: N/A;   • CARDIAC CATHETERIZATION  10/13/2020    Procedure: Functional Flow [place];  Surgeon: Prince Mccollum MD;  Location:  [facility] C[facility]VAS[facility] LOC[facility];  Service: Cardiology;;   •  SECTION     • HYSTERECTOMY         Social History     Socioeconomic History   • Marital status:      Spouse name: Not on file   • Number of children: Not on file   • Years of education:  Not on file   • Highest education level: Not on file   Tobacco Use   • Smoking status: Never Smoker   • Smokeless tobacco: Never Used   Substance and Sexual Activity   • Alcohol use: Yes     Alcohol/week: 1.0 standard drinks     Types: 1 Glasses of wine per week     Comment: Occassionally (3-4 DRINKS/WEEK)   • Drug use: No   • Sexual activity: Defer       Family History   Problem Relation Age of Onset   • Diabetes Father    • Stroke Father    • Heart disease Sister    • Heart disease Brother         ROS:   Constitutional no fever,  no weight loss   Skin no rash, no subcutaneous nodules   Otolaryngeal no difficulty swallowing   Cardiovascular See HPI   Pulmonary no cough, no sputum production   Gastrointestinal no constipation, no diarrhea   Genitourinary no dysuria, no hematuria   Hematologic no easy bruisability, no abnormal bleeding   Musculoskeletal no muscle pain   Neurologic no dizziness, no falls         Allergies   Allergen Reactions   • Codeine GI Intolerance         Current Outpatient Medications:   •  aspirin (aspirin) 81 MG EC tablet, Take 1 tablet by mouth Daily., Disp: 90 tablet, Rfl: 3  •  cholecalciferol (VITAMIN D3) 25 MCG (1000 UT) tablet, Take 1,000 Units by mouth Daily., Disp: , Rfl:   •  furosemide (LASIX) 40 MG tablet, As Needed., Disp: , Rfl:   •  glucosamine-chondroitin 500-400 MG capsule capsule, Take 1 capsule by mouth 2 (Two) Times a Day With Meals., Disp: , Rfl:   •  nitroglycerin (NITROSTAT) 0.4 MG SL tablet, 1 under the tongue as needed for angina, may repeat q5mins for up three doses, Disp: 25 tablet, Rfl: 11  •  traZODone (DESYREL) 50 MG tablet, Take 100 mg by mouth At Night As Needed., Disp: , Rfl: 3  •  Turmeric 500 MG tablet, Take 1 tablet by mouth 2 (two) times a day., Disp: , Rfl:   •  Bempedoic Acid-Ezetimibe (Nexlizet) 180-10 MG tablet, Take 180 mg by mouth Daily., Disp: 90 tablet, Rfl: 3    Vitals:    07/12/21 1415   BP: 120/60   BP Location: Left arm   Patient Position:  "Sitting   Pulse: 62   Weight: 51.6 kg (113 lb 12.8 oz)   Height: 165.1 cm (65\")     Body mass index is 18.94 kg/m².    PHYSICAL EXAM:    General Appearance:   · well developed  · well nourished  HENT:   · oropharynx moist  · lips not cyanotic  Neck:  · thyroid not enlarged  · supple  Respiratory:  · no respiratory distress  · normal breath sounds  · no rales  Cardiovascular:  · no jugular venous distention  · regular rhythm  · apical impulse normal  · S1 normal, S2 normal  · no S3, no S4   · no murmur  · no rub, no thrill  · carotid pulses normal; no bruit  · pedal pulses normal  · lower extremity edema: none    Gastrointestinal:   · bowel sounds normal  · non-tender  · no hepatomegaly, no splenomegaly  Musculoskeletal:  · no clubbing of fingers.   · normocephalic, head atraumatic  Skin:   · warm, dry  Psychiatric:  · judgement and insight appropriate  · normal mood and affect    RESULTS:   Procedures          Labs:  CBC 07/17/20   WBC 27.4   RBC 4.72   Hemoglobin 14.4   Hematocrit 44.8   Platelet 299       CMP  07/17/20   Glucose 89   BUN 22   Creatinine 0.82   Glomerular Filtration Rate 71   BUN/Creatinine Ratio NA   Sodium 139   Potassium 4.3   Carbon Dioxide 18 L   Calcium 9.4   AST 19   ALT 18       LIPID PANEL 07/17/20   Total 325 H   Triglycerides 83       H       TSH 1.12         ASSESSMENT:  Problem List Items Addressed This Visit     None          PLAN:    1.  CAD:  Cath 10/20, showed moderate LAD disease  Aggressive risk factor modification with lipid lowering therapy, aspirin  Prescribed as needed nitroglycerin  Currently no typical symptoms she is exercising 8 hours/week without angina.    Continue exercise and diet    The patient was advised to call 911 if chest pain worsens or persist despite nitroglycerin or rest.    2.  Severe hypercholesterolemia:  Likely familial given the severity of her lipids  Lifetime ASCVD risk 39%  Patient was intolerant to at least 3 statins due to myalgia and " recurrent UTI's.   Also intolerant to bempedoic acid    Prescribe Repatha, will try to get prior authorization from insurance    3.  Family history of premature CAD:  Prevention measures as above    Return to clinic in 9 months, or sooner as needed.    Thank you for the opportunity to share in the care of your patient; please do not hesitate to call me with any questions.     Prince Mccollum MD, [facility]  Office: [phone] [address] [place] [address]    07/12/21

## 2021-07-13 RX ORDER — EVOLOCUMAB 420 MG/3.5
420 KIT SUBCUTANEOUS
Qty: 3.5 ML | Refills: 11 | Status: SHIPPED | OUTPATIENT
Start: 2021-07-13 | End: 2022-01-04 | Stop reason: CLARIF

## 2021-07-13 NOTE — TELEPHONE ENCOUNTER
Spoke with Padmini at [email or web address] to verify Repatha PA was approved from 10/9/20 to 11/18/23. The cost is $20/month. The case ID for the approved PA is [id number].     LVM for pt with the information above. Will await pt return call if further questions.

## 2022-02-22 RX ORDER — EVOLOCUMAB 420 MG/3.5
420 KIT SUBCUTANEOUS
Qty: 3.5 ML | Refills: 11 | Status: SHIPPED | OUTPATIENT
Start: 2022-02-22 | End: 2022-03-29 | Stop reason: SDUPTHER

## 2022-02-23 ENCOUNTER — TELEPHONE (OUTPATIENT)
Dept: CARDIOLOGY | Facility: CLINIC | Age: 72
End: 2022-02-23

## 2022-02-23 NOTE — TELEPHONE ENCOUNTER
An active PA is already on file for Repatha pushtronex with expiration date of 11/18/2023. Please wait to resubmit request within 60 days of that expiration date to obtain a PA renewal.    We are trying [employer]. Will send in clarice info to [facility] if she is approved.

## 2022-03-29 RX ORDER — EVOLOCUMAB 420 MG/3.5
420 KIT SUBCUTANEOUS
Qty: 1 CARTRIDGE | Refills: 1
Start: 2022-03-29 | End: 2022-08-05 | Stop reason: SDUPTHER

## 2022-05-06 NOTE — PROGRESS NOTES
OFFICE VISIT  NOTE  [facility] CARDIOLOGY      Name: Clare Peace    Date: 2022  MRN:  [id number]  :  1950      REFERRING/PRIMARY PROVIDER:  Angela Keating APRN    Chief Complaint   Patient presents with   • Coronary artery disease of native artery of native heart wi       HPI: Clare Peace is a 71 y.o. female who presents today for CAD and hyperlipidemia.  Cardiac catheterization 10/13/20 showed tandem mid to distal LAD 50 to 60% lesions, nonsignificant by IFR pullback.  She has a strong family history of premature CAD, her brother  at age 58 from a myocardial infarction, sister  at age 64 after CABG, father  of strokes in 50s, she has lifelong hypercholesterolemia as well as her whole family.  Intolerant to statins due to myalgia, has tried for in the past.  Repatha was too expensive therefore Nexilezet was prescribed, however she experienced similar symptoms to statins with muscle ache, fatigue, lack of energy.  Under a lot of stress recently, waking up in the mornings with chest burning sensation, somewhat worse with wine or spicy food.  Also having nosebleeds.  Still working out, sometimes shortness of breath at the end of the workout.  Denies chest pain with exercise.    Past Medical History:   Diagnosis Date   • Osteoarthritis    • PONV (postoperative nausea and vomiting)        Past Surgical History:   Procedure Laterality Date   • CARDIAC CATHETERIZATION N/A 10/13/2020    Procedure: Left Heart Cath;  Surgeon: Prince Mccollum MD;  Location:  [facility] CATH INVASIVE LOCATION;  Service: Cardiology;  Laterality: N/A;   • CARDIAC CATHETERIZATION  10/13/2020    Procedure: Functional Flow [place];  Surgeon: Prince Mccollum MD;  Location:  [facility] CATH INVASIVE LOCATION;  Service: Cardiology;;   •  SECTION     • HYSTERECTOMY         Social History     Socioeconomic History   • Marital status:    Tobacco Use   • Smoking status: Never Smoker   •  Smokeless tobacco: Never Used   Vaping Use   • Vaping Use: Never used   Substance and Sexual Activity   • Alcohol use: Yes     Alcohol/week: 1.0 standard drink     Types: 1 Glasses of wine per week     Comment: Occassionally (3-4 DRINKS/WEEK)   • Drug use: No   • Sexual activity: Defer       Family History   Problem Relation Age of Onset   • Diabetes Father    • Stroke Father    • Heart disease Sister    • Heart disease Brother         ROS:   Constitutional no fever,  no weight loss   Skin no rash, no subcutaneous nodules   Otolaryngeal no difficulty swallowing   Cardiovascular See HPI   Pulmonary no cough, no sputum production   Gastrointestinal no constipation, no diarrhea   Genitourinary no dysuria, no hematuria   Hematologic no easy bruisability, no abnormal bleeding   Musculoskeletal no muscle pain   Neurologic no dizziness, no falls         Allergies   Allergen Reactions   • Codeine GI Intolerance         Current Outpatient Medications:   •  aspirin (aspirin) 81 MG EC tablet, Take 1 tablet by mouth Daily., Disp: 90 tablet, Rfl: 3  •  Evolocumab with Infusor (Repatha Pushtronex System) solution cartridge, Inject 3.5 mL under the skin into the appropriate area as directed Every 30 (Thirty) Days., Disp: 1 cartridge., Rfl: 1  •  furosemide (LASIX) 40 MG tablet, Take 40 mg by mouth As Needed., Disp: , Rfl:   •  glucosamine-chondroitin 500-400 MG capsule capsule, Take 1 capsule by mouth Daily., Disp: , Rfl:   •  meloxicam (MOBIC) 15 MG tablet, Take 15 mg by mouth As Needed., Disp: , Rfl:   •  nitroglycerin (NITROSTAT) 0.4 MG SL tablet, 1 under the tongue as needed for angina, may repeat q5mins for up three doses, Disp: 25 tablet, Rfl: 11  •  traZODone (DESYREL) 50 MG tablet, Take 100 mg by mouth At Night As Needed., Disp: , Rfl: 3  •  Turmeric 500 MG tablet, Take 1 tablet by mouth 2 (two) times a day., Disp: , Rfl:     Vitals:    05/09/22 0936   BP: 134/92   BP Location: Right arm   Patient Position: Sitting   Cuff  "Size: Adult   Pulse: 77   SpO2: 96%   Weight: 55.8 kg (123 lb)   Height: 165.1 cm (65\")     Body mass index is 20.47 kg/m².    PHYSICAL EXAM:    General Appearance:   · well developed  · well nourished  HENT:   · oropharynx moist  · lips not cyanotic  Neck:  · thyroid not enlarged  · supple  Respiratory:  · no respiratory distress  · normal breath sounds  · no rales  Cardiovascular:  · no jugular venous distention  · regular rhythm  · apical impulse normal  · S1 normal, S2 normal  · no S3, no S4   · no murmur  · no rub, no thrill  · carotid pulses normal; no bruit  · pedal pulses normal  · lower extremity edema: none    Gastrointestinal:   · bowel sounds normal  · non-tender  · no hepatomegaly, no splenomegaly  Musculoskeletal:  · no clubbing of fingers.   · normocephalic, head atraumatic  Skin:   · warm, dry  Psychiatric:  · judgement and insight appropriate  · normal mood and affect    RESULTS:     ECG 12 Lead    Date/Time: 5/9/2022 10:10 AM  Performed by: Prince Mccollum MD  Authorized by: Prince Mccollum MD   Comparison: compared with previous ECG from 8/24/2020  Similar to previous ECG  Rhythm: sinus rhythm  Rate: normal  QRS axis: normal    Clinical impression: non-specific ECG                  Labs:  CBC 07/17/20   WBC 27.4   RBC 4.72   Hemoglobin 14.4   Hematocrit 44.8   Platelet 299       CMP  07/17/20   Glucose 89   BUN 22   Creatinine 0.82   Glomerular Filtration Rate 71   BUN/Creatinine Ratio NA   Sodium 139   Potassium 4.3   Carbon Dioxide 18 L   Calcium 9.4   AST 19   ALT 18       LIPID PANEL 07/17/20   Total 325 H   Triglycerides 83       H       TSH 1.12         ASSESSMENT:  Problem List Items Addressed This Visit        Cardiac and Vasculature    Hyperlipidemia LDL goal <100    Chest pain    Overview     01/31/18 Treadmill echo  · Good exercise tolerance  · No ischemic EKG changes  · No obvious regional wall motion abnormalities            SOB (shortness of breath) on exertion    " Elevated coronary artery calcium score    Overview     CT cardiac calcium scoring to detect 10 distinct calcified  plaque lesions including 4 within the LAD and 6 within the right  coronary artery totaling a calcium volume 194.9 cu mm with calcium score  calculated at 225.4 placing patient in the moderate calcification  category for patient demographic.           Coronary artery disease of native artery of native heart with stable angina pectoris (HCC) - Primary    Overview     10/13/2020: Cardiac catheterization, LAD mid heavily calcified 50-60% stenosis at the bifurcation of the first diagonal, followed by a 40-50% of the second diagonal bifurcation and a 50 to 60% in the distal LAD, IFR mildly positive at 0.87.  RCA 20-30% diffuse stenosis.  Small nondominant left circumflex, without significant disease.,  LVEDP 4 mmHg.  Medical management as the patient has relatively mild symptoms, and intervention of the LAD will require rotational atherectomy.              Symptoms and Signs    Localized edema          PLAN:    1.  CAD:  Cath 10/20, showed moderate LAD disease  Aggressive risk factor modification with lipid lowering therapy, aspirin  Prescribed as needed nitroglycerin    Chest burning sensation, concern for angina, I recommend medical therapy with metoprolol, or Imdur, or Ranexa, however she is reluctant to start medications because she is quite sensitive to all medications.    She request a trial period of antianxiety or antidepressant medicines due to extreme stress lately, she will contact her PCP regarding this, I advised her to call us if there is any worsening symptoms.  I will see her back in 4 weeks.      The patient was advised to call 911 if chest pain worsens or persist despite nitroglycerin or rest.    2.  Severe hypercholesterolemia:  Likely familial given the severity of her lipids  Lifetime ASCVD risk 39%  Patient was intolerant to at least 3 statins due to myalgia and recurrent UTI's.   Also  intolerant to bempedoic acid  Repatha was too expensive    3.  Family history of premature CAD:  Prevention measures as above    Return to clinic in 1 month, or sooner as needed.    Thank you for the opportunity to share in the care of your patient; please do not hesitate to call me with any questions.     Prince Mccollum MD, [facility]  Office: [phone] [address] [place] [address]    05/09/22

## 2022-05-09 ENCOUNTER — OFFICE VISIT (OUTPATIENT)
Dept: CARDIOLOGY | Facility: CLINIC | Age: 72
End: 2022-05-09

## 2022-05-09 VITALS
SYSTOLIC BLOOD PRESSURE: 134 MMHG | HEIGHT: 65 IN | BODY MASS INDEX: 20.49 KG/M2 | WEIGHT: 123 LBS | HEART RATE: 77 BPM | OXYGEN SATURATION: 96 % | DIASTOLIC BLOOD PRESSURE: 92 MMHG

## 2022-05-09 DIAGNOSIS — R06.02 SOB (SHORTNESS OF BREATH) ON EXERTION: ICD-10-CM

## 2022-05-09 DIAGNOSIS — R93.1 ELEVATED CORONARY ARTERY CALCIUM SCORE: ICD-10-CM

## 2022-05-09 DIAGNOSIS — I25.118 CORONARY ARTERY DISEASE OF NATIVE ARTERY OF NATIVE HEART WITH STABLE ANGINA PECTORIS: Primary | ICD-10-CM

## 2022-05-09 DIAGNOSIS — R07.9 CHEST PAIN, UNSPECIFIED TYPE: ICD-10-CM

## 2022-05-09 DIAGNOSIS — R60.0 LOCALIZED EDEMA: ICD-10-CM

## 2022-05-09 DIAGNOSIS — E78.5 HYPERLIPIDEMIA LDL GOAL <100: ICD-10-CM

## 2022-05-09 PROCEDURE — 93000 ELECTROCARDIOGRAM COMPLETE: CPT | Performed by: INTERNAL MEDICINE

## 2022-05-09 PROCEDURE — 99214 OFFICE O/P EST MOD 30 MIN: CPT | Performed by: INTERNAL MEDICINE

## 2022-05-09 RX ORDER — MELOXICAM 15 MG/1
15 TABLET ORAL AS NEEDED
Status: ON HOLD | COMMUNITY
Start: 2022-03-29 | End: 2022-10-25

## 2022-07-11 ENCOUNTER — OFFICE VISIT (OUTPATIENT)
Dept: CARDIOLOGY | Facility: CLINIC | Age: 72
End: 2022-07-11

## 2022-07-11 VITALS
BODY MASS INDEX: 19.33 KG/M2 | WEIGHT: 116 LBS | SYSTOLIC BLOOD PRESSURE: 116 MMHG | HEART RATE: 74 BPM | OXYGEN SATURATION: 95 % | HEIGHT: 65 IN | DIASTOLIC BLOOD PRESSURE: 80 MMHG

## 2022-07-11 DIAGNOSIS — R93.1 ELEVATED CORONARY ARTERY CALCIUM SCORE: ICD-10-CM

## 2022-07-11 DIAGNOSIS — R07.9 CHEST PAIN, UNSPECIFIED TYPE: ICD-10-CM

## 2022-07-11 DIAGNOSIS — I25.118 CORONARY ARTERY DISEASE OF NATIVE ARTERY OF NATIVE HEART WITH STABLE ANGINA PECTORIS: Primary | ICD-10-CM

## 2022-07-11 DIAGNOSIS — E78.5 HYPERLIPIDEMIA LDL GOAL <100: ICD-10-CM

## 2022-07-11 DIAGNOSIS — R06.02 SOB (SHORTNESS OF BREATH) ON EXERTION: ICD-10-CM

## 2022-07-11 PROCEDURE — 99214 OFFICE O/P EST MOD 30 MIN: CPT | Performed by: INTERNAL MEDICINE

## 2022-07-11 NOTE — PROGRESS NOTES
OFFICE VISIT  NOTE  [facility] CARDIOLOGY      Name: Clare Peace    Date: 2022  MRN:  [id number]  :  1950      REFERRING/PRIMARY PROVIDER:  Angela Keating APRN    Chief Complaint   Patient presents with   • Coronary artery disease of native artery of native heart wi       HPI: Clare Peace is a 71 y.o. female who presents today for follow-up for CAD and hyperlipidemia.  Cardiac catheterization 10/13/20 showed tandem mid to distal LAD 50 to 60% lesions, nonsignificant by IFR pullback.  She has a strong family history of premature CAD, her brother  at age 58 from a myocardial infarction, sister  at age 64 after CABG, father  of strokes in 50s, she has lifelong hypercholesterolemia as well as her whole family.  Intolerant to statins due to myalgia, has tried for in the past.  Repatha is expensive therefore Nexilezet was prescribed, however she experienced similar symptoms to statins with muscle ache, fatigue, lack of energy.  Stress is somewhat better now, her daughter is undergoing breast cancer treatment.  She is tolerating Repatha with no side effects, although it is expensive.  She tolerates it well.  Couple episodes of dizziness.    Past Medical History:   Diagnosis Date   • Osteoarthritis    • PONV (postoperative nausea and vomiting)        Past Surgical History:   Procedure Laterality Date   • CARDIAC CATHETERIZATION N/A 10/13/2020    Procedure: Left Heart Cath;  Surgeon: Prince Mccollum MD;  Location:  [facility];  Service: Cardiology;  Laterality: N/A;   • CARDIAC CATHETERIZATION  10/13/2020    Procedure: Functional Flow Napoleon;  Surgeon: Prince Mccollum MD;  Location:  [facility];  Service: Cardiology;;   •  SECTION     • HYSTERECTOMY         Social History     Socioeconomic History   • Marital status:    Tobacco Use   • Smoking status: Never Smoker   • Smokeless tobacco: Never Used   Vaping Use   •  Vaping Use: Never used   Substance and Sexual Activity   • Alcohol use: Yes     Alcohol/week: 3.0 standard drinks     Types: 3 Drinks containing 0.5 oz of alcohol per week     Comment: Occassionally (3-4 DRINKS/WEEK)   • Drug use: No   • Sexual activity: Not Currently     Partners: Male       Family History   Problem Relation Age of Onset   • Diabetes Father    • Stroke Father    • Heart disease Sister    • Heart disease Brother    • Heart attack Brother    • Heart disease Brother         ROS:   Constitutional no fever,  no weight loss   Skin no rash, no subcutaneous nodules   Otolaryngeal no difficulty swallowing   Cardiovascular See HPI   Pulmonary no cough, no sputum production   Gastrointestinal no constipation, no diarrhea   Genitourinary no dysuria, no hematuria   Hematologic no easy bruisability, no abnormal bleeding   Musculoskeletal no muscle pain   Neurologic no dizziness, no falls         Allergies   Allergen Reactions   • Codeine GI Intolerance         Current Outpatient Medications:   •  aspirin (aspirin) 81 MG EC tablet, Take 1 tablet by mouth Daily., Disp: 90 tablet, Rfl: 3  •  furosemide (LASIX) 40 MG tablet, Take 40 mg by mouth As Needed., Disp: , Rfl:   •  glucosamine-chondroitin 500-400 MG capsule capsule, Take 1 capsule by mouth Daily., Disp: , Rfl:   •  meloxicam (MOBIC) 15 MG tablet, Take 15 mg by mouth As Needed., Disp: , Rfl:   •  nitroglycerin (NITROSTAT) 0.4 MG SL tablet, 1 under the tongue as needed for angina, may repeat q5mins for up three doses, Disp: 25 tablet, Rfl: 11  •  traZODone (DESYREL) 50 MG tablet, Take 100 mg by mouth At Night As Needed., Disp: , Rfl: 3  •  Turmeric 500 MG tablet, Take 1 tablet by mouth 2 (two) times a day., Disp: , Rfl:   •  Evolocumab with Infusor (Repatha Pushtronex System) solution cartridge, Inject 3.5 mL under the skin into the appropriate area as directed Every 30 (Thirty) Days., Disp: 1 cartridge., Rfl: 1    Vitals:    07/11/22 1413   BP: 116/80   BP  "Location: Left arm   Patient Position: Sitting   Pulse: 74   SpO2: 95%   Weight: 52.6 kg (116 lb)   Height: 165.1 cm (65\")     Body mass index is 19.3 kg/m².    PHYSICAL EXAM:    General Appearance:   · well developed  · well nourished  HENT:   · oropharynx moist  · lips not cyanotic  Neck:  · thyroid not enlarged  · supple  Respiratory:  · no respiratory distress  · normal breath sounds  · no rales  Cardiovascular:  · no jugular venous distention  · regular rhythm  · apical impulse normal  · S1 normal, S2 normal  · no S3, no S4   · no murmur  · no rub, no thrill  · carotid pulses normal; no bruit  · pedal pulses normal  · lower extremity edema: none    Gastrointestinal:   · bowel sounds normal  · non-tender  · no hepatomegaly, no splenomegaly  Musculoskeletal:  · no clubbing of fingers.   · normocephalic, head atraumatic  Skin:   · warm, dry  Psychiatric:  · judgement and insight appropriate  · normal mood and affect    RESULTS:   Procedures          Labs:  CBC 07/17/20   WBC 27.4   RBC 4.72   Hemoglobin 14.4   Hematocrit 44.8   Platelet 299       CMP  07/17/20   Glucose 89   BUN 22   Creatinine 0.82   Glomerular Filtration Rate 71   BUN/Creatinine Ratio NA   Sodium 139   Potassium 4.3   Carbon Dioxide 18 L   Calcium 9.4   AST 19   ALT 18       LIPID PANEL 07/17/20   Total 325 H   Triglycerides 83       H       TSH 1.12         ASSESSMENT:  Problem List Items Addressed This Visit        Cardiac and Vasculature    Hyperlipidemia LDL goal <100    Chest pain    Overview     01/31/18 Treadmill echo  · Good exercise tolerance  · No ischemic EKG changes  · No obvious regional wall motion abnormalities            SOB (shortness of breath) on exertion    Elevated coronary artery calcium score    Overview     CT cardiac calcium scoring to detect 10 distinct calcified  plaque lesions including 4 within the LAD and 6 within the right  coronary artery totaling a calcium volume 194.9 cu mm with calcium " score  calculated at 225.4 placing patient in the moderate calcification  category for patient demographic.           Coronary artery disease of native artery of native heart with stable angina pectoris (HCC) - Primary    Overview     10/13/2020: Cardiac catheterization, LAD mid heavily calcified 50-60% stenosis at the bifurcation of the first diagonal, followed by a 40-50% of the second diagonal bifurcation and a 50 to 60% in the distal LAD, IFR mildly positive at 0.87.  RCA 20-30% diffuse stenosis.  Small nondominant left circumflex, without significant disease.,  LVEDP 4 mmHg.  Medical management as the patient has relatively mild symptoms, and intervention of the LAD will require rotational atherectomy.                 PLAN:    1.  CAD:  Cath 10/20, showed moderate LAD disease  Aggressive risk factor modification with lipid lowering therapy, aspirin  Prescribed as needed nitroglycerin    Continue Repatha  Symptoms mainly after eating, she is having EGD 8/17/2022 at [facility] we will follow-up on results.      The patient was advised to call 911 if chest pain worsens or persist despite nitroglycerin or rest.    2.  Severe hypercholesterolemia:  Likely familial given the severity of her lipids  Lifetime ASCVD risk 39%  Patient was intolerant to at least 3 statins due to myalgia and recurrent UTI's.   intolerant to bempedoic acid  Repatha is expensive but she is tolerating it without side effects, will continue and recheck lipids in 3 months    3.  Family history of premature CAD:  Prevention measures as above    Return to clinic in 1 year, or sooner as needed.    Thank you for the opportunity to share in the care of your patient; please do not hesitate to call me with any questions.     Prince Mccollum MD, [facility]  Office: [phone]  [facility] [place] [address]    07/11/22

## 2022-08-05 ENCOUNTER — TELEPHONE (OUTPATIENT)
Dept: CARDIOLOGY | Facility: CLINIC | Age: 72
End: 2022-08-05

## 2022-08-05 DIAGNOSIS — R07.9 CHEST PAIN, UNSPECIFIED TYPE: Primary | ICD-10-CM

## 2022-08-05 DIAGNOSIS — R06.02 SOB (SHORTNESS OF BREATH) ON EXERTION: ICD-10-CM

## 2022-08-05 RX ORDER — EVOLOCUMAB 420 MG/3.5
420 KIT SUBCUTANEOUS
Qty: 1 CARTRIDGE | Refills: 11 | Status: ON HOLD | OUTPATIENT
Start: 2022-08-05 | End: 2022-10-25

## 2022-08-05 NOTE — TELEPHONE ENCOUNTER
Pt is having recurrent chest pain and fatigue- took 1 nitro yesterday. She reports RDS mentioned something about doing a repeat heart cath if chest pain did not improve, and that she was to call us if she changed her mind. I cannot find this in office visit in May or July. Cath 10/2020 showed mod LAD disease. Refills for Repatha sent today.    Okay to proceed with [facility]?

## 2022-08-08 NOTE — TELEPHONE ENCOUNTER
Pt wants to hold off on Imdur 30 mg daily but amendable to proceed with stress test. She is scheduled for upcoming EGD and colonoscopy 8/17 because she thinks chest pain could be related to acid reflux as she was woke up with this chest pain and hasn't been avoiding foods that excerbate acid reflux. Advised to let me know EGD results and if she wants to start Imdur we can. Educated pt on Nitro use and when to proceed to the ER, pt verbalized understanding and agreeable to plan.

## 2022-09-15 ENCOUNTER — HOSPITAL ENCOUNTER (OUTPATIENT)
Dept: CARDIOLOGY | Facility: HOSPITAL | Age: 72
Discharge: HOME | End: 2022-09-15
Admitting: INTERNAL MEDICINE

## 2022-09-15 ENCOUNTER — APPOINTMENT (OUTPATIENT)
Dept: CARDIOLOGY | Facility: HOSPITAL | Age: 72
End: 2022-09-15

## 2022-09-15 VITALS
BODY MASS INDEX: 19.33 KG/M2 | WEIGHT: 116 LBS | DIASTOLIC BLOOD PRESSURE: 76 MMHG | HEIGHT: 65 IN | HEART RATE: 65 BPM | SYSTOLIC BLOOD PRESSURE: 122 MMHG

## 2022-09-15 DIAGNOSIS — R07.9 CHEST PAIN, UNSPECIFIED TYPE: ICD-10-CM

## 2022-09-15 DIAGNOSIS — R06.02 SOB (SHORTNESS OF BREATH) ON EXERTION: ICD-10-CM

## 2022-09-15 PROCEDURE — 78452 HT MUSCLE IMAGE SPECT MULT: CPT

## 2022-09-15 PROCEDURE — A9500 TC99M SESTAMIBI: HCPCS | Performed by: INTERNAL MEDICINE

## 2022-09-15 PROCEDURE — 0 TECHNETIUM SESTAMIBI: Performed by: INTERNAL MEDICINE

## 2022-09-15 PROCEDURE — 93018 CV STRESS TEST I&R ONLY: CPT | Performed by: INTERNAL MEDICINE

## 2022-09-15 PROCEDURE — 78452 HT MUSCLE IMAGE SPECT MULT: CPT | Performed by: INTERNAL MEDICINE

## 2022-09-15 PROCEDURE — 93017 CV STRESS TEST TRACING ONLY: CPT

## 2022-09-15 RX ADMIN — TECHNETIUM TC 99M SESTAMIBI 1 DOSE: 1 INJECTION INTRAVENOUS at 08:06

## 2022-09-15 RX ADMIN — TECHNETIUM TC 99M SESTAMIBI 1 DOSE: 1 INJECTION INTRAVENOUS at 10:00

## 2022-09-16 LAB
BH CV REST NUCLEAR ISOTOPE DOSE: 9.9 MCI
BH CV STRESS BP STAGE 1: NORMAL
BH CV STRESS BP STAGE 3: NORMAL
BH CV STRESS DURATION MIN STAGE 1: 3
BH CV STRESS DURATION MIN STAGE 2: 3
BH CV STRESS DURATION MIN STAGE 3: 3
BH CV STRESS DURATION SEC STAGE 1: 0
BH CV STRESS DURATION SEC STAGE 2: 0
BH CV STRESS DURATION SEC STAGE 3: 36
BH CV STRESS GRADE STAGE 1: 10
BH CV STRESS GRADE STAGE 2: 12
BH CV STRESS GRADE STAGE 3: 14
BH CV STRESS HR STAGE 1: 91
BH CV STRESS HR STAGE 2: 109
BH CV STRESS HR STAGE 3: 120
BH CV STRESS METS STAGE 1: 5
BH CV STRESS METS STAGE 2: 7.5
BH CV STRESS METS STAGE 3: 10
BH CV STRESS NUCLEAR ISOTOPE DOSE: 33 MCI
BH CV STRESS O2 STAGE 1: 100
BH CV STRESS O2 STAGE 2: 100
BH CV STRESS O2 STAGE 3: 100
BH CV STRESS PROTOCOL 1: NORMAL
BH CV STRESS RECOVERY BP: NORMAL MMHG
BH CV STRESS RECOVERY HR: 81 BPM
BH CV STRESS RECOVERY O2: 100 %
BH CV STRESS SPEED STAGE 1: 1.7
BH CV STRESS SPEED STAGE 2: 2.5
BH CV STRESS SPEED STAGE 3: 3.4
BH CV STRESS STAGE 1: 1
BH CV STRESS STAGE 2: 2
BH CV STRESS STAGE 3: 3
LV EF NUC BP: 87 %
MAXIMAL PREDICTED HEART RATE: 151 BPM
PERCENT MAX PREDICTED HR: 85.43 %
STRESS BASELINE BP: NORMAL MMHG
STRESS BASELINE HR: 71 BPM
STRESS O2 SAT REST: 100 %
STRESS PERCENT HR: 101 %
STRESS POST ESTIMATED WORKLOAD: 10.1 METS
STRESS POST EXERCISE DUR MIN: 9 MIN
STRESS POST EXERCISE DUR SEC: 36 SEC
STRESS POST O2 SAT PEAK: 100 %
STRESS POST PEAK BP: NORMAL MMHG
STRESS POST PEAK HR: 129 BPM
STRESS TARGET HR: 128 BPM

## 2022-09-19 ENCOUNTER — TELEPHONE (OUTPATIENT)
Dept: CARDIOLOGY | Facility: CLINIC | Age: 72
End: 2022-09-19

## 2022-09-19 NOTE — TELEPHONE ENCOUNTER
----- Message from Prince Mccollum MD sent at 9/19/2022  9:03 AM EDT -----  Please inform the patient of their test results. Thank you.

## 2022-09-21 ENCOUNTER — TELEPHONE (OUTPATIENT)
Dept: CARDIOLOGY | Facility: CLINIC | Age: 72
End: 2022-09-21

## 2022-09-21 DIAGNOSIS — R06.02 SOB (SHORTNESS OF BREATH) ON EXERTION: ICD-10-CM

## 2022-09-21 DIAGNOSIS — R07.9 CHEST PAIN, UNSPECIFIED TYPE: Primary | ICD-10-CM

## 2022-09-21 DIAGNOSIS — R93.1 ELEVATED CORONARY ARTERY CALCIUM SCORE: ICD-10-CM

## 2022-09-21 DIAGNOSIS — I20.8 ANGINAL EQUIVALENT: ICD-10-CM

## 2022-09-21 NOTE — TELEPHONE ENCOUNTER
Pt reports consistent SOB with activity and is requesting a heart cath despite low risk stress test on 9/16. Last LHC 10/13/20- Moderate tandem 50-60% lesions of the mid/distal LAD, and tandem IFR was 0.87, but pullback shows that the mid lesion is nonsignificant at 0.95, mild nonobstructive 20-30% lesions of the proximal and mid RCA. Last labs 6/16/22 , she has inconsistent use of Repatha due to traveling but has taken recently.    Please advise

## 2022-10-07 PROBLEM — I20.89 ANGINAL EQUIVALENT: Status: ACTIVE | Noted: 2022-10-07

## 2022-10-07 PROBLEM — I20.8 ANGINAL EQUIVALENT (HCC): Status: ACTIVE | Noted: 2022-10-07

## 2022-10-24 ENCOUNTER — PREP FOR SURGERY (OUTPATIENT)
Dept: OTHER | Facility: HOSPITAL | Age: 72
End: 2022-10-24

## 2022-10-24 RX ORDER — ASPIRIN 81 MG/1
324 TABLET, CHEWABLE ORAL ONCE
Status: CANCELLED | OUTPATIENT
Start: 2022-10-24 | End: 2022-10-24

## 2022-10-24 RX ORDER — ASPIRIN 81 MG/1
81 TABLET ORAL DAILY
Status: CANCELLED | OUTPATIENT
Start: 2022-10-25

## 2022-10-24 RX ORDER — SODIUM CHLORIDE 0.9 % (FLUSH) 0.9 %
10 SYRINGE (ML) INJECTION AS NEEDED
Status: CANCELLED | OUTPATIENT
Start: 2022-10-24

## 2022-10-24 RX ORDER — ACETAMINOPHEN 325 MG/1
650 TABLET ORAL EVERY 4 HOURS PRN
Status: CANCELLED | OUTPATIENT
Start: 2022-10-24

## 2022-10-24 RX ORDER — SODIUM CHLORIDE 0.9 % (FLUSH) 0.9 %
10 SYRINGE (ML) INJECTION EVERY 12 HOURS SCHEDULED
Status: CANCELLED | OUTPATIENT
Start: 2022-10-24

## 2022-10-24 RX ORDER — NITROGLYCERIN 0.4 MG/1
0.4 TABLET SUBLINGUAL
Status: CANCELLED | OUTPATIENT
Start: 2022-10-24

## 2022-10-25 ENCOUNTER — APPOINTMENT (OUTPATIENT)
Dept: CARDIOLOGY | Facility: HOSPITAL | Age: 72
End: 2022-10-25

## 2022-10-25 ENCOUNTER — HOSPITAL ENCOUNTER (OUTPATIENT)
Facility: HOSPITAL | Age: 72
Setting detail: HOSPITAL OUTPATIENT SURGERY
Discharge: HOME | End: 2022-10-25
Attending: INTERNAL MEDICINE | Admitting: INTERNAL MEDICINE

## 2022-10-25 VITALS
DIASTOLIC BLOOD PRESSURE: 72 MMHG | SYSTOLIC BLOOD PRESSURE: 113 MMHG | TEMPERATURE: 97.2 F | BODY MASS INDEX: 20.33 KG/M2 | RESPIRATION RATE: 18 BRPM | HEIGHT: 65 IN | WEIGHT: 122 LBS | OXYGEN SATURATION: 99 % | HEART RATE: 73 BPM

## 2022-10-25 DIAGNOSIS — I25.118 CORONARY ARTERY DISEASE OF NATIVE ARTERY OF NATIVE HEART WITH STABLE ANGINA PECTORIS: Primary | ICD-10-CM

## 2022-10-25 DIAGNOSIS — R93.1 ELEVATED CORONARY ARTERY CALCIUM SCORE: ICD-10-CM

## 2022-10-25 DIAGNOSIS — I20.8 ANGINAL EQUIVALENT: ICD-10-CM

## 2022-10-25 DIAGNOSIS — R06.02 SOB (SHORTNESS OF BREATH) ON EXERTION: ICD-10-CM

## 2022-10-25 DIAGNOSIS — R07.9 CHEST PAIN, UNSPECIFIED TYPE: ICD-10-CM

## 2022-10-25 LAB
ACT BLD: 225 SECONDS (ref 82–152)
ACT BLD: 225 SECONDS (ref 82–152)
ACT BLD: 248 SECONDS (ref 82–152)
ACT BLD: 260 SECONDS (ref 82–152)
ALBUMIN SERPL-MCNC: 4 G/DL (ref 3.5–5.2)
ALBUMIN/GLOB SERPL: 1.7 G/DL
ALP SERPL-CCNC: 90 U/L (ref 39–117)
ALT SERPL W P-5'-P-CCNC: 18 U/L (ref 1–33)
ANION GAP SERPL CALCULATED.3IONS-SCNC: 11 MMOL/L (ref 5–15)
AST SERPL-CCNC: 19 U/L (ref 1–32)
BILIRUB SERPL-MCNC: 0.3 MG/DL (ref 0–1.2)
BUN SERPL-MCNC: 18 MG/DL (ref 8–23)
BUN/CREAT SERPL: 22.8 (ref 7–25)
CALCIUM SPEC-SCNC: 9 MG/DL (ref 8.6–10.5)
CHLORIDE SERPL-SCNC: 101 MMOL/L (ref 98–107)
CHOLEST SERPL-MCNC: 285 MG/DL (ref 0–200)
CO2 SERPL-SCNC: 22 MMOL/L (ref 22–29)
CREAT SERPL-MCNC: 0.79 MG/DL (ref 0.57–1)
DEPRECATED RDW RBC AUTO: 44.9 FL (ref 37–54)
EGFRCR SERPLBLD CKD-EPI 2021: 80.6 ML/MIN/1.73
ERYTHROCYTE [DISTWIDTH] IN BLOOD BY AUTOMATED COUNT: 13.1 % (ref 12.3–15.4)
GLOBULIN UR ELPH-MCNC: 2.3 GM/DL
GLUCOSE SERPL-MCNC: 95 MG/DL (ref 65–99)
HBA1C MFR BLD: 5.5 % (ref 4.8–5.6)
HCT VFR BLD AUTO: 41 % (ref 34–46.6)
HDLC SERPL-MCNC: 98 MG/DL (ref 40–60)
HGB BLD-MCNC: 13.6 G/DL (ref 12–15.9)
LDLC SERPL CALC-MCNC: 171 MG/DL (ref 0–100)
LDLC/HDLC SERPL: 1.71 {RATIO}
MCH RBC QN AUTO: 30.9 PG (ref 26.6–33)
MCHC RBC AUTO-ENTMCNC: 33.2 G/DL (ref 31.5–35.7)
MCV RBC AUTO: 93.2 FL (ref 79–97)
PLATELET # BLD AUTO: 298 10*3/MM3 (ref 140–450)
PMV BLD AUTO: 9.4 FL (ref 6–12)
POTASSIUM SERPL-SCNC: 4 MMOL/L (ref 3.5–5.2)
PROT SERPL-MCNC: 6.3 G/DL (ref 6–8.5)
RBC # BLD AUTO: 4.4 10*6/MM3 (ref 3.77–5.28)
SODIUM SERPL-SCNC: 134 MMOL/L (ref 136–145)
TRIGL SERPL-MCNC: 97 MG/DL (ref 0–150)
VLDLC SERPL-MCNC: 16 MG/DL (ref 5–40)
WBC NRBC COR # BLD: 6.5 10*3/MM3 (ref 3.4–10.8)

## 2022-10-25 PROCEDURE — 93005 ELECTROCARDIOGRAM TRACING: CPT | Performed by: INTERNAL MEDICINE

## 2022-10-25 PROCEDURE — 92978 ENDOLUMINL IVUS OCT C 1ST: CPT | Performed by: INTERNAL MEDICINE

## 2022-10-25 PROCEDURE — C1725 CATH, TRANSLUMIN NON-LASER: HCPCS | Performed by: INTERNAL MEDICINE

## 2022-10-25 PROCEDURE — C1887 CATHETER, GUIDING: HCPCS | Performed by: INTERNAL MEDICINE

## 2022-10-25 PROCEDURE — 93306 TTE W/DOPPLER COMPLETE: CPT | Performed by: INTERNAL MEDICINE

## 2022-10-25 PROCEDURE — C1753 CATH, INTRAVAS ULTRASOUND: HCPCS | Performed by: INTERNAL MEDICINE

## 2022-10-25 PROCEDURE — 85347 COAGULATION TIME ACTIVATED: CPT

## 2022-10-25 PROCEDURE — 93458 L HRT ARTERY/VENTRICLE ANGIO: CPT | Performed by: INTERNAL MEDICINE

## 2022-10-25 PROCEDURE — C1769 GUIDE WIRE: HCPCS | Performed by: INTERNAL MEDICINE

## 2022-10-25 PROCEDURE — 93306 TTE W/DOPPLER COMPLETE: CPT

## 2022-10-25 PROCEDURE — C1874 STENT, COATED/COV W/DEL SYS: HCPCS | Performed by: INTERNAL MEDICINE

## 2022-10-25 PROCEDURE — 0 IOPAMIDOL PER 1 ML: Performed by: INTERNAL MEDICINE

## 2022-10-25 PROCEDURE — 92928 PRQ TCAT PLMT NTRAC ST 1 LES: CPT | Performed by: INTERNAL MEDICINE

## 2022-10-25 PROCEDURE — 25010000002 FENTANYL CITRATE (PF) 50 MCG/ML SOLUTION: Performed by: INTERNAL MEDICINE

## 2022-10-25 PROCEDURE — 83036 HEMOGLOBIN GLYCOSYLATED A1C: CPT | Performed by: PHYSICIAN ASSISTANT

## 2022-10-25 PROCEDURE — 80053 COMPREHEN METABOLIC PANEL: CPT | Performed by: PHYSICIAN ASSISTANT

## 2022-10-25 PROCEDURE — 25010000002 MIDAZOLAM PER 1 MG: Performed by: INTERNAL MEDICINE

## 2022-10-25 PROCEDURE — C9601 PERC DRUG-EL COR STENT BRAN: HCPCS | Performed by: INTERNAL MEDICINE

## 2022-10-25 PROCEDURE — C1894 INTRO/SHEATH, NON-LASER: HCPCS | Performed by: INTERNAL MEDICINE

## 2022-10-25 PROCEDURE — 25010000002 PHENYLEPHRINE 10 MG/ML SOLUTION: Performed by: INTERNAL MEDICINE

## 2022-10-25 PROCEDURE — 25010000002 ONDANSETRON PER 1 MG: Performed by: INTERNAL MEDICINE

## 2022-10-25 PROCEDURE — 93005 ELECTROCARDIOGRAM TRACING: CPT | Performed by: PHYSICIAN ASSISTANT

## 2022-10-25 PROCEDURE — C9600 PERC DRUG-EL COR STENT SING: HCPCS | Performed by: INTERNAL MEDICINE

## 2022-10-25 PROCEDURE — 85027 COMPLETE CBC AUTOMATED: CPT | Performed by: PHYSICIAN ASSISTANT

## 2022-10-25 PROCEDURE — 80061 LIPID PANEL: CPT | Performed by: PHYSICIAN ASSISTANT

## 2022-10-25 PROCEDURE — 36415 COLL VENOUS BLD VENIPUNCTURE: CPT

## 2022-10-25 PROCEDURE — 25010000002 HEPARIN (PORCINE) PER 1000 UNITS: Performed by: INTERNAL MEDICINE

## 2022-10-25 DEVICE — XIENCE SKYPOINT™ EVEROLIMUS ELUTING CORONARY STENT SYSTEM 3.00 MM X 18 MM / RAPID-EXCHANGE
Type: IMPLANTABLE DEVICE | Status: FUNCTIONAL
Brand: XIENCE SKYPOINT™

## 2022-10-25 DEVICE — XIENCE SKYPOINT™ EVEROLIMUS ELUTING CORONARY STENT SYSTEM 2.25 MM X 38 MM / RAPID-EXCHANGE
Type: IMPLANTABLE DEVICE | Status: FUNCTIONAL
Brand: XIENCE SKYPOINT™

## 2022-10-25 RX ORDER — SODIUM CHLORIDE 9 MG/ML
1 INJECTION, SOLUTION INTRAVENOUS CONTINUOUS
Status: ACTIVE | OUTPATIENT
Start: 2022-10-25 | End: 2022-10-25

## 2022-10-25 RX ORDER — LIDOCAINE HYDROCHLORIDE 10 MG/ML
INJECTION, SOLUTION EPIDURAL; INFILTRATION; INTRACAUDAL; PERINEURAL
Status: DISCONTINUED | OUTPATIENT
Start: 2022-10-25 | End: 2022-10-25 | Stop reason: HOSPADM

## 2022-10-25 RX ORDER — FENTANYL CITRATE 50 UG/ML
INJECTION, SOLUTION INTRAMUSCULAR; INTRAVENOUS
Status: DISCONTINUED | OUTPATIENT
Start: 2022-10-25 | End: 2022-10-25 | Stop reason: HOSPADM

## 2022-10-25 RX ORDER — SODIUM CHLORIDE 0.9 % (FLUSH) 0.9 %
10 SYRINGE (ML) INJECTION EVERY 12 HOURS SCHEDULED
Status: DISCONTINUED | OUTPATIENT
Start: 2022-10-25 | End: 2022-10-25 | Stop reason: HOSPADM

## 2022-10-25 RX ORDER — ISOSORBIDE MONONITRATE 30 MG/1
30 TABLET, EXTENDED RELEASE ORAL DAILY
Qty: 30 TABLET | Refills: 0 | Status: SHIPPED | OUTPATIENT
Start: 2022-10-25 | End: 2023-01-09

## 2022-10-25 RX ORDER — SODIUM CHLORIDE 0.9 % (FLUSH) 0.9 %
10 SYRINGE (ML) INJECTION AS NEEDED
Status: DISCONTINUED | OUTPATIENT
Start: 2022-10-25 | End: 2022-10-25 | Stop reason: HOSPADM

## 2022-10-25 RX ORDER — NITROGLYCERIN 0.4 MG/1
0.4 TABLET SUBLINGUAL
Status: DISCONTINUED | OUTPATIENT
Start: 2022-10-25 | End: 2022-10-25 | Stop reason: HOSPADM

## 2022-10-25 RX ORDER — ACETAMINOPHEN 325 MG/1
650 TABLET ORAL EVERY 4 HOURS PRN
Status: DISCONTINUED | OUTPATIENT
Start: 2022-10-25 | End: 2022-10-25 | Stop reason: HOSPADM

## 2022-10-25 RX ORDER — SODIUM CHLORIDE 9 MG/ML
3 INJECTION, SOLUTION INTRAVENOUS CONTINUOUS
Status: ACTIVE | OUTPATIENT
Start: 2022-10-25 | End: 2022-10-25

## 2022-10-25 RX ORDER — MORPHINE SULFATE 2 MG/ML
2 INJECTION, SOLUTION INTRAMUSCULAR; INTRAVENOUS
Status: DISCONTINUED | OUTPATIENT
Start: 2022-10-25 | End: 2022-10-25 | Stop reason: HOSPADM

## 2022-10-25 RX ORDER — NICARDIPINE HCL-0.9% SOD CHLOR 1 MG/10 ML
SYRINGE (ML) INTRAVENOUS
Status: DISCONTINUED | OUTPATIENT
Start: 2022-10-25 | End: 2022-10-25 | Stop reason: HOSPADM

## 2022-10-25 RX ORDER — CLOPIDOGREL BISULFATE 75 MG/1
TABLET ORAL
Status: DISCONTINUED | OUTPATIENT
Start: 2022-10-25 | End: 2022-10-25 | Stop reason: HOSPADM

## 2022-10-25 RX ORDER — ASPIRIN 81 MG/1
81 TABLET ORAL DAILY
Status: DISCONTINUED | OUTPATIENT
Start: 2022-10-26 | End: 2022-10-25 | Stop reason: HOSPADM

## 2022-10-25 RX ORDER — PHENYLEPHRINE HYDROCHLORIDE 10 MG/ML
INJECTION INTRAVENOUS
Status: DISCONTINUED | OUTPATIENT
Start: 2022-10-25 | End: 2022-10-25 | Stop reason: HOSPADM

## 2022-10-25 RX ORDER — NITROGLYCERIN 20 MG/100ML
INJECTION INTRAVENOUS
Status: COMPLETED | OUTPATIENT
Start: 2022-10-25 | End: 2022-10-25

## 2022-10-25 RX ORDER — ONDANSETRON 2 MG/ML
4 INJECTION INTRAMUSCULAR; INTRAVENOUS ONCE
Status: COMPLETED | OUTPATIENT
Start: 2022-10-25 | End: 2022-10-25

## 2022-10-25 RX ORDER — SODIUM CHLORIDE 9 MG/ML
3 INJECTION, SOLUTION INTRAVENOUS CONTINUOUS
Status: CANCELLED | OUTPATIENT
Start: 2022-10-25 | End: 2022-10-25

## 2022-10-25 RX ORDER — CLOPIDOGREL BISULFATE 75 MG/1
75 TABLET ORAL DAILY
Qty: 90 TABLET | Refills: 3 | Status: SHIPPED | OUTPATIENT
Start: 2022-10-25 | End: 2023-01-06 | Stop reason: SDUPTHER

## 2022-10-25 RX ORDER — HEPARIN SODIUM 1000 [USP'U]/ML
INJECTION, SOLUTION INTRAVENOUS; SUBCUTANEOUS
Status: DISCONTINUED | OUTPATIENT
Start: 2022-10-25 | End: 2022-10-25 | Stop reason: HOSPADM

## 2022-10-25 RX ORDER — OMEPRAZOLE 20 MG/1
20 CAPSULE, DELAYED RELEASE ORAL DAILY
COMMUNITY

## 2022-10-25 RX ORDER — ASPIRIN 81 MG/1
324 TABLET, CHEWABLE ORAL ONCE
Status: COMPLETED | OUTPATIENT
Start: 2022-10-25 | End: 2022-10-25

## 2022-10-25 RX ORDER — MIDAZOLAM HYDROCHLORIDE 1 MG/ML
INJECTION INTRAMUSCULAR; INTRAVENOUS
Status: DISCONTINUED | OUTPATIENT
Start: 2022-10-25 | End: 2022-10-25 | Stop reason: HOSPADM

## 2022-10-25 RX ADMIN — ASPIRIN 81 MG 324 MG: 81 TABLET ORAL at 07:58

## 2022-10-25 RX ADMIN — ONDANSETRON 4 MG: 2 INJECTION INTRAMUSCULAR; INTRAVENOUS at 12:42

## 2022-10-25 RX ADMIN — SODIUM CHLORIDE 3 ML/KG/HR: 9 INJECTION, SOLUTION INTRAVENOUS at 07:56

## 2022-10-25 NOTE — H&P
Pre-cardiac Catheterization History and Physical  [facility]      Patient:  Clare Peace  :  1950  MRN: [id number]    PCP:  Angela Keating APRN  PHONE:  [phone]    DATE: 10/25/2022  ID: Clare Peace is a 72 y.o. female resident of [place], [place]     CC: CAD with chest pain and MEDINA     PROBLEM LIST:   Active Hospital Problems    Diagnosis  POA   • Anginal equivalent (HCC) [I20.8]  Unknown     Priority: Low     Added automatically from request for surgery [id number]     • Coronary artery disease of native artery of native heart with stable angina pectoris (HCC) [I25.118]  Yes     Priority: Low     10/13/2020: Cardiac catheterization, LAD mid heavily calcified 50-60% stenosis at the bifurcation of the first diagonal, followed by a 40-50% of the second diagonal bifurcation and a 50 to 60% in the distal LAD, IFR mildly positive at 0.87.  RCA 20-30% diffuse stenosis.  Small nondominant left circumflex, without significant disease.,  LVEDP 4 mmHg.  Medical management as the patient has relatively mild symptoms, and intervention of the LAD will require rotational atherectomy.     • Elevated coronary artery calcium score [R93.1]  Unknown     Priority: Low     CT cardiac calcium scoring to detect 10 distinct calcified  plaque lesions including 4 within the LAD and 6 within the right  coronary artery totaling a calcium volume 194.9 cu mm with calcium score  calculated at 225.4 placing patient in the moderate calcification  category for patient demographic.     • Chest pain [R07.9]  Unknown     Priority: Low     18 Treadmill echo  · Good exercise tolerance  · No ischemic EKG changes  · No obvious regional wall motion abnormalities      • SOB (shortness of breath) on exertion [R06.02]  Unknown     Priority: Low       BRIEF HPI: Mrs. Peace is a 73 y/o female with CAD, HTN, HLD and family history of premature CAD who presents for elective cardiac cath today. She had  "a cath 2 years ago which showed mid LAD 50-60% stenosis not significant by iFR. Recently contacted our offices due to worsening chest pain and MEDINA with activity. Stress MPS 9/15/22 with no ischemia, however due to ongoing symptoms, cardiac cath was recommended and she presents in this regard.     Cardiac Risk Factors: known CAD, advanced age (older than 55 for men, 65 for women), dyslipidemia, family history of premature cardiovascular disease and hypertension    Allergies:      Allergies   Allergen Reactions   • Codeine GI Intolerance       MEDICATIONS:  Current Outpatient Medications   Medication Instructions   • aspirin 81 mg, Oral, Daily   • furosemide (LASIX) 40 mg, Oral, As Needed   • glucosamine-chondroitin 500-400 MG capsule capsule 1 capsule, Oral, Daily   • nitroglycerin (NITROSTAT) 0.4 MG SL tablet 1 under the tongue as needed for angina, may repeat q5mins for up three doses   • traZODone (DESYREL) 100 mg, Oral, Nightly PRN   • Turmeric 500 MG tablet 1 tablet, Oral, 2 times daily       Past medical & surgical history, social and family history reviewed in the electronic medical record.    ROS: Pertinent positives listed in the HPI and problem list above. All others reviewed and negative.     Physical Exam:   Ht 165.1 cm (65\")   Wt 55.3 kg (122 lb)   BMI 20.30 kg/m²     Constitutional:    Alert, cooperative, in no acute distress   Neck:     No Jugular venous distention, adenopathy, or thyromegaly noted.    Heart:    Regular rhythm and normal rate, normal S1 and S2, no murmurs,gallops, rubs, or clicks. No distinct PMI noted.    Lungs:     Clear to auscultation bilaterally, respirations regular, even and unlabored    Abdomen:     Soft nontender, nondistended, normal bowel sounds   Extremities:   No gross deformities, no edema, clubbing, or cyanosis.    Pulses:   Peripheral pulses palpable and equal bilaterally.     Barbaeu Test:  Left: Not Assessed  (oxymetric Allens) Right: Normal     Labs and Diagnostic " Data:          Lab Results   Component Value Date    CHOL 217 (H) 10/13/2020    TRIG 68 10/13/2020    HDL 87 (H) 10/13/2020     (H) 10/13/2020                 Stress MPS 9/15/22:  Interpretation Summary    • Myocardial perfusion imaging indicates a normal myocardial perfusion study with no evidence of ischemia.  • Patient denied chest pain but did c/o SOA (SpO2 WNL on RA) at peak exercise.  • Expected exercise duration = 5:50. Actual 9:36. DINA (-49).  • No significant ST or T changes noted.  • Left ventricular ejection fraction is hyperdynamic (Calculated EF > 70%). .  • Findings consistent with a normal ECG stress test.  • Impressions are consistent with a low risk study.       Tele: SR    IMPRESSION:  · 73 y/o female with CAD, HTN, HLD and recent symptoms of angina and MEDINA. Stress MPS low risk recently, however she has persistent symptoms.     PLAN:  · Procedure to perform: LHC +/- CBI. Risks, benefits and alternatives to the procedure explained to the patient and she understands and wishes to proceed.       Electronically signed by Janell Ann PA-C, 10/25/22, 7:46 AM EDT.  The risks, benefits, and alternative options of cardiac catheterization were discussed with the patient.  The risks include death, MI, stroke, infection, vascular injury requiring surgical repair and/or blood transfusion, coronary dissection, renal dysfunction/failure, allergic reaction, emergent CABG.  If PCI is needed there is a 1-2% risk of emergent CABG.  The patient is agreeable for cardiac catheterization, possible PCI or CABG. Plan is to proceed with cardiac catheterization and possible PCI.     Prince Mccollum M.D., F.A.C.C.  Interventional Cardiology  10/25/22  08:10 EDT

## 2022-10-25 NOTE — SIGNIFICANT NOTE
Dime sized drainage on gauze on right radial site, dressing changed, no arterial bleeding - scant oozing from site. New gauze and tegaderm applied to site. No evidence of active bleeding or swelling.

## 2022-10-26 ENCOUNTER — DOCUMENTATION (OUTPATIENT)
Dept: CARDIAC REHAB | Facility: HOSPITAL | Age: 72
End: 2022-10-26

## 2022-10-26 ENCOUNTER — CALL CENTER PROGRAMS (OUTPATIENT)
Dept: CALL CENTER | Facility: HOSPITAL | Age: 72
End: 2022-10-26

## 2022-10-26 LAB
BH CV ECHO MEAS - AO MAX PG: 6.3 MMHG
BH CV ECHO MEAS - AO MEAN PG: 3 MMHG
BH CV ECHO MEAS - AO ROOT DIAM: 3.4 CM
BH CV ECHO MEAS - AO V2 MAX: 125 CM/SEC
BH CV ECHO MEAS - AO V2 VTI: 29.2 CM
BH CV ECHO MEAS - AVA(I,D): 1.38 CM2
BH CV ECHO MEAS - EDV(CUBED): 29.8 ML
BH CV ECHO MEAS - EDV(MOD-SP2): 83.2 ML
BH CV ECHO MEAS - EDV(MOD-SP4): 83 ML
BH CV ECHO MEAS - EF(MOD-SP2): 51.3 %
BH CV ECHO MEAS - EF(MOD-SP4): 51.2 %
BH CV ECHO MEAS - ESV(CUBED): 12.2 ML
BH CV ECHO MEAS - ESV(MOD-SP2): 40.5 ML
BH CV ECHO MEAS - ESV(MOD-SP4): 40.5 ML
BH CV ECHO MEAS - FS: 25.8 %
BH CV ECHO MEAS - IVS/LVPW: 1.13 CM
BH CV ECHO MEAS - IVSD: 0.9 CM
BH CV ECHO MEAS - LA DIMENSION: 3.3 CM
BH CV ECHO MEAS - LAT PEAK E' VEL: 11 CM/SEC
BH CV ECHO MEAS - LV MASS(C)D: 67.8 GRAMS
BH CV ECHO MEAS - LV MAX PG: 2.06 MMHG
BH CV ECHO MEAS - LV MEAN PG: 1 MMHG
BH CV ECHO MEAS - LV V1 MAX: 71.8 CM/SEC
BH CV ECHO MEAS - LV V1 VTI: 17.8 CM
BH CV ECHO MEAS - LVIDD: 3.1 CM
BH CV ECHO MEAS - LVIDS: 2.3 CM
BH CV ECHO MEAS - LVOT AREA: 2.27 CM2
BH CV ECHO MEAS - LVOT DIAM: 1.7 CM
BH CV ECHO MEAS - LVPWD: 0.8 CM
BH CV ECHO MEAS - MED PEAK E' VEL: 9.5 CM/SEC
BH CV ECHO MEAS - MV A MAX VEL: 85.9 CM/SEC
BH CV ECHO MEAS - MV DEC SLOPE: 597 CM/SEC2
BH CV ECHO MEAS - MV E MAX VEL: 86.5 CM/SEC
BH CV ECHO MEAS - MV E/A: 1.01
BH CV ECHO MEAS - MV MAX PG: 3.4 MMHG
BH CV ECHO MEAS - MV MEAN PG: 1 MMHG
BH CV ECHO MEAS - MV P1/2T: 47.2 MSEC
BH CV ECHO MEAS - MV V2 VTI: 26.3 CM
BH CV ECHO MEAS - MVA(P1/2T): 4.7 CM2
BH CV ECHO MEAS - MVA(VTI): 1.54 CM2
BH CV ECHO MEAS - PA ACC TIME: 0.13 SEC
BH CV ECHO MEAS - PA PR(ACCEL): 18.7 MMHG
BH CV ECHO MEAS - SV(LVOT): 40.4 ML
BH CV ECHO MEAS - SV(MOD-SP2): 42.7 ML
BH CV ECHO MEAS - SV(MOD-SP4): 42.5 ML
BH CV ECHO MEAS - TAPSE (>1.6): 2 CM
BH CV ECHO MEAS - TR MAX PG: 18.5 MMHG
BH CV ECHO MEAS - TR MAX VEL: 215 CM/SEC
BH CV ECHO MEASUREMENTS AVERAGE E/E' RATIO: 8.44
BH CV VAS BP LEFT ARM: NORMAL MMHG
BH CV XLRA - RV BASE: 3.4 CM
BH CV XLRA - RV LENGTH: 6.9 CM
BH CV XLRA - RV MID: 2.6 CM
BH CV XLRA - TDI S': 11.1 CM/SEC
LEFT ATRIUM VOLUME INDEX: 20.1 ML/M2
LV EF 2D ECHO EST: 55 %
MAXIMAL PREDICTED HEART RATE: 150 BPM
QT INTERVAL: 392 MS
QT INTERVAL: 430 MS
QTC INTERVAL: 441 MS
QTC INTERVAL: 464 MS
STRESS TARGET HR: 128 BPM

## 2022-10-26 NOTE — OUTREACH NOTE
PCI/Device Survey    Flowsheet Row Responses   Facility patient discharged from? [facility]   Procedure date 10/25/22   Procedure (if device, specify in description) PCI   PCI site Right, Arm   Performing MD Dr. Prince Mccollum   Attempt successful? Yes   Call start time [id number]   Call end time 0913   Has the patient had any of the following symptoms since discharge? Chest pain   Nursing interventions Advised to call MD   Symptom comments had some episodes of chest pain but this morning she is better, advised to call MD if symptoms get worse   Is the patient taking prescribed medications: Plavix   Nursing intervention Reminded to continue to take prescribed medications   Does the patient have any of the following symptoms related to the cath/surgical site? --  [no symptoms noted]   Nursing intervention Patient education provided   Does the patient have an appointment scheduled with the cardiologist? Yes   Appointment comments f/u with cardiology on 1/9/23   Did the patient feel prepared to go home on the same day as the procedure? Yes   Is the patient satisfied with the same day discharge process? Yes   PCI/Device call completed Yes   Wrap up additional comments Doing well, all questions addressed.          JOSE ALBERTO ROACH - Registered Nurse

## 2022-10-28 ENCOUNTER — TELEPHONE (OUTPATIENT)
Dept: CARDIAC REHAB | Facility: HOSPITAL | Age: 72
End: 2022-10-28

## 2022-10-28 ENCOUNTER — TRANSCRIBE ORDERS (OUTPATIENT)
Dept: CARDIAC REHAB | Facility: HOSPITAL | Age: 72
End: 2022-10-28

## 2022-10-28 DIAGNOSIS — Z95.5 STENTED CORONARY ARTERY: Primary | ICD-10-CM

## 2022-10-28 NOTE — TELEPHONE ENCOUNTER
Pt. Referred for Phase II Cardiac Rehab. Staff discussed benefits of exercise, program protocol, and educational material provided. Teach back verified.  Patient scheduled for orientation at [facility] on 11/8/22 @ 1300.

## 2022-10-31 RX ORDER — NITROGLYCERIN 0.4 MG/1
TABLET SUBLINGUAL
Qty: 25 TABLET | Refills: 11 | Status: SHIPPED | OUTPATIENT
Start: 2022-10-31

## 2022-11-08 ENCOUNTER — APPOINTMENT (OUTPATIENT)
Dept: CARDIAC REHAB | Facility: HOSPITAL | Age: 72
End: 2022-11-08

## 2022-11-08 ENCOUNTER — TREATMENT (OUTPATIENT)
Dept: CARDIAC REHAB | Facility: HOSPITAL | Age: 72
End: 2022-11-08

## 2022-11-08 DIAGNOSIS — Z95.5 STENTED CORONARY ARTERY: ICD-10-CM

## 2022-11-08 NOTE — PROGRESS NOTES
Patient attended phase II cardiac rehab orientation. Staff discussed benefits of exercise, program protocol, and educational material provided. Teach back verified.  While discussing cardiac rehab, patient stated she  lives closer to [facility]. Staff called [facility] and schedule patient for an orientation on 11/16/22 at 1315.Permission granted from patient for staff to fax referral information to [facility].

## 2022-11-10 ENCOUNTER — TELEPHONE (OUTPATIENT)
Dept: CARDIAC REHAB | Facility: HOSPITAL | Age: 72
End: 2022-11-10

## 2022-11-10 NOTE — TELEPHONE ENCOUNTER
Patient called to reschedule cardiac rehab orientation. Patient's orientation is now on Tuesday November 15th at 1:00pm.

## 2022-11-15 ENCOUNTER — TREATMENT (OUTPATIENT)
Dept: CARDIAC REHAB | Facility: HOSPITAL | Age: 72
End: 2022-11-15

## 2022-11-15 DIAGNOSIS — Z95.5 STENTED CORONARY ARTERY: Primary | ICD-10-CM

## 2022-11-15 PROCEDURE — 93798 PHYS/QHP OP CAR RHAB W/ECG: CPT

## 2022-11-15 NOTE — PROGRESS NOTES
Attended Phase II Cardiac Rehab orientation. Medication or health history reviewed. See [facility] for details.

## 2022-11-21 ENCOUNTER — TREATMENT (OUTPATIENT)
Dept: CARDIAC REHAB | Facility: HOSPITAL | Age: 72
End: 2022-11-21

## 2022-11-21 DIAGNOSIS — Z95.5 STENTED CORONARY ARTERY: Primary | ICD-10-CM

## 2022-11-21 PROCEDURE — 93798 PHYS/QHP OP CAR RHAB W/ECG: CPT

## 2022-11-23 ENCOUNTER — TREATMENT (OUTPATIENT)
Dept: CARDIAC REHAB | Facility: HOSPITAL | Age: 72
End: 2022-11-23

## 2022-11-23 ENCOUNTER — APPOINTMENT (OUTPATIENT)
Dept: CARDIAC REHAB | Facility: HOSPITAL | Age: 72
End: 2022-11-23

## 2022-11-23 DIAGNOSIS — Z95.5 STENTED CORONARY ARTERY: Primary | ICD-10-CM

## 2022-11-23 PROCEDURE — 93797 PHYS/QHP OP CAR RHAB WO ECG: CPT

## 2022-11-23 NOTE — PROGRESS NOTES
"      CARDIAC/PULMONARY REHAB NUTRITION EDUCATION/ASSESSMENT   Appointment Date and Time: 22, 12:58 EST  Patient Name: Clare Peace   Age: 72 y.o.     Sex:  female        Weight Assessment:   Height:   Ht Readings from Last 1 Encounters:   10/25/22 165.1 cm (65\")     Weight:   Wt Readings from Last 1 Encounters:   10/25/22 55.3 kg (122 lb)         BMI:    BMI Readings from Last 1 Encounters:   10/25/22 20.30 kg/m²     -------------------------------------------------------------------------------------------------  IBW: Ideal body weight: 57 kg (125 lb 10.6 oz)  -------------------------------------------------------------------------------------------------  Weight Assessment: Normal  Weight Change last six months: range 114-116 and 121-125 lb       Usual Weight: 116 lb       Desired Weight: 116 lb    Cardiac Risk Factors:         Stents       Atherosclerotic heart disease       Hypercholesterolemia       Family history of atherosclerotic disease. twin brother  age 58, sister at age 64, both cardiac related deaths.       Non- smoker, +alcohol         Pertinent Lab Values:     Total Cholesterol   Date Value Ref Range Status   10/25/2022 285 (H) 0 - 200 mg/dL Final     HDL Cholesterol   Date Value Ref Range Status   10/25/2022 98 (H) 40 - 60 mg/dL Final     LDL Cholesterol    Date Value Ref Range Status   10/25/2022 171 (H) 0 - 100 mg/dL Final     LDL/HDL Ratio   Date Value Ref Range Status   10/25/2022 1.71  Final     Triglycerides   Date Value Ref Range Status   10/25/2022 97 0 - 150 mg/dL Final     Hemoglobin A1C   Date Value Ref Range Status   10/25/2022 5.50 4.80 - 5.60 % Final     Glucose   Date Value Ref Range Status   10/25/2022 95 65 - 99 mg/dL Final       Diet:   Diet Survey Score: 58 of 72 possible for a score of 81% AHA compliant  Current Diet: Heart healthy mixed with a love to cook using full fat recipes on occasion  Appetite: good   Factors limiting PO intake: none  Taste/smell changes: "  No  Food records reviewed?: Yes, completed review of 'Rate Your Plate' score and tallies.  Extensive review and discussion of home diet today.    Nutritional Assessment:   Nutritional Supplements: turmeric, glucosamine, apple cider vinegar, garlic  Pertinent Nutrition-Related Medications: Reviewed - no changes recommended at this time.  Meal intake pattern:  1 meal plus snack. Long hx hypoglycemia - knows should have carbs more consistently throughout day  Dining out:  Occasionally, usually [place]  Fast food:  rare  Home diet review:  Generally Heart Healthy Fat intake, Moderate Sodium intake and Strives for a Heart Healthy Diet  Motivation level toward diet compliance:  strong    Home Support:   Clare lives with:  Dario  Who does the cooking at home:  Clare  Who does the shopping at home:  Clare Sparks receives lifestyle support from others at home?: Yes  Spouse/significant other present for diet instruction today?: No    Employment/Activity:   Occupation:  Retired from teaching, 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 Has significant RA so exercising with water aerobics/lino, etc several times weekly. Is having difficulty w/pain in cardiac rehab    Self Identified Problems or Concerns:   Love to cook, so some unhealthy items for guests/family events. Would like weight to stay at 114-116 lb.    Assessment / Recommendations:   Clare's education level: college  Prior diet education before to coming to Cardiac Rehab?: No  Instructed provided on:  Cardiac diet, Lipid management and Label reading for heart health  Written materials provided to patient: Yes    Goals/Plan:   Patient defined goals:    1) calculate actual saturated fat intake for a couple days to see baseline. Strive for <8-9 g saturated fat/day    Plan:  Will access RD during cardiac rehab to share success/failure of goals; will re-evaluate goals over time and add additional heart  healthy goals    Keke Baez RD, 12:58 EST - 11/23/2022

## 2022-11-28 ENCOUNTER — APPOINTMENT (OUTPATIENT)
Dept: CARDIAC REHAB | Facility: HOSPITAL | Age: 72
End: 2022-11-28

## 2022-11-30 ENCOUNTER — APPOINTMENT (OUTPATIENT)
Dept: CARDIAC REHAB | Facility: HOSPITAL | Age: 72
End: 2022-11-30

## 2023-01-06 RX ORDER — CLOPIDOGREL BISULFATE 75 MG/1
75 TABLET ORAL DAILY
Qty: 90 TABLET | Refills: 3 | Status: SHIPPED | OUTPATIENT
Start: 2023-01-06 | End: 2023-01-30 | Stop reason: SDUPTHER

## 2023-01-06 NOTE — PROGRESS NOTES
OFFICE VISIT  NOTE  [facility] CARDIOLOGY [place]      Name: Clare Peace    Date: 2023  MRN:  [id number]  :  1950      REFERRING/PRIMARY PROVIDER:  Angela Keating APRN    Chief Complaint   Patient presents with   • Coronary Artery Disease              HPI: Clare Peace is a 72 y.o. female who presents today for follow-up for CAD and hyperlipidemia.  Cardiac catheterization 10/25/22 showed mid LAD 75% lesions, status post PCI with MARIOLA, RCA 20 to 30%, normal circumflex.  Echo 10/25/2022 showed EF 55 to 60% with normal valves.  she has lifelong hypercholesterolemia as well as her whole family.  Intolerant to statins due to myalgia, has tried for in the past. Nexilezet caused similar symptoms to statins with muscle ache, fatigue, lack of energy.   She is tolerating Repatha with no side effects, has not had a repeat lipids since starting Repatha 10/2022.  Chest pain symptoms resolved after PCI.  Exercising most days of the week at home.  Compliant with all her medications    Past Medical History:   Diagnosis Date   • Osteoarthritis    • PONV (postoperative nausea and vomiting)        Past Surgical History:   Procedure Laterality Date   • CARDIAC CATHETERIZATION N/A 10/13/2020    Procedure: Left Heart Cath;  Surgeon: Prince Mccollum MD;  Location:  [facility];  Service: Cardiology;  Laterality: N/A;   • CARDIAC CATHETERIZATION  10/13/2020    Procedure: Functional Flow [place];  Surgeon: Prince Mccollum MD;  Location:  [facility];  Service: Cardiology;;   • CARDIAC CATHETERIZATION Left 10/25/2022    Procedure: Left Heart Cath;  Surgeon: Prince Mccollum MD;  Location:  [facility];  Service: Cardiovascular;  Laterality: Left;   •  SECTION     • HYSTERECTOMY         Social History     Socioeconomic History   • Marital status:    Tobacco Use   • Smoking status: Never   • Smokeless tobacco: Never   Vaping Use    • Vaping Use: Never used   Substance and Sexual Activity   • Alcohol use: Not Currently     Comment: weekly   • Drug use: No   • Sexual activity: Defer       Family History   Problem Relation Age of Onset   • Diabetes Father    • Stroke Father    • Heart disease Sister    • Heart disease Brother    • Heart attack Brother    • Heart disease Brother         ROS:   Constitutional no fever,  no weight loss   Skin no rash, no subcutaneous nodules   Otolaryngeal no difficulty swallowing   Cardiovascular See HPI   Pulmonary no cough, no sputum production   Gastrointestinal no constipation, no diarrhea   Genitourinary no dysuria, no hematuria   Hematologic no easy bruisability, no abnormal bleeding   Musculoskeletal no muscle pain   Neurologic no dizziness, no falls         Allergies   Allergen Reactions   • Statins Myalgia   • Codeine GI Intolerance         Current Outpatient Medications:   •  aspirin (aspirin) 81 MG EC tablet, Take 1 tablet by mouth Daily., Disp: 90 tablet, Rfl: 3  •  clopidogrel (PLAVIX) 75 MG tablet, Take 1 tablet by mouth Daily., Disp: 90 tablet, Rfl: 3  •  DULoxetine (CYMBALTA) 30 MG capsule, Daily., Disp: , Rfl:   •  Evolocumab with Infusor (REPATHA) solution cartridge, Inject 3.5 mL under the skin into the appropriate area as directed Every 30 (Thirty) Days., Disp: 10.5 mL, Rfl: 3  •  furosemide (LASIX) 40 MG tablet, Take 40 mg by mouth As Needed., Disp: , Rfl:   •  glucosamine-chondroitin 500-400 MG capsule capsule, Take 1 capsule by mouth Daily., Disp: , Rfl:   •  Menaquinone-7 (VITAMIN K2 PO), Take 1 tablet by mouth As Needed., Disp: , Rfl:   •  nitroglycerin (NITROSTAT) 0.4 MG SL tablet, 1 under the tongue as needed for angina, may repeat q5mins for up three doses, Disp: 25 tablet, Rfl: 11  •  omeprazole (priLOSEC) 20 MG capsule, Take 1 capsule by mouth Daily., Disp: , Rfl:   •  traZODone (DESYREL) 50 MG tablet, Take 100 mg by mouth At Night As Needed., Disp: , Rfl: 3  •  Turmeric 500 MG  tablet, Take 1 tablet by mouth 2 (two) times a day., Disp: , Rfl:     Vitals:    01/09/23 0933   BP: 118/78   BP Location: Right arm   Patient Position: Sitting   Pulse: 74   SpO2: 98%   Weight: 55.3 kg (122 lb)   Height: 165.1 cm (65\")     Body mass index is 20.3 kg/m².    PHYSICAL EXAM:    General Appearance:   · well developed  · well nourished  HENT:   · oropharynx moist  · lips not cyanotic  Neck:  · thyroid not enlarged  · supple  Respiratory:  · no respiratory distress  · normal breath sounds  · no rales  Cardiovascular:  · no jugular venous distention  · regular rhythm  · apical impulse normal  · S1 normal, S2 normal  · no S3, no S4   · no murmur  · no rub, no thrill  · carotid pulses normal; no bruit  · pedal pulses normal  · lower extremity edema: none    Gastrointestinal:   · bowel sounds normal  · non-tender  · no hepatomegaly, no splenomegaly  Musculoskeletal:  · no clubbing of fingers.   · normocephalic, head atraumatic  Skin:   · warm, dry  Psychiatric:  · judgement and insight appropriate  · normal mood and affect    RESULTS:   Procedures    Results for orders placed during the hospital encounter of 10/25/22    Adult Transthoracic Echo Complete W/ Cont if Necessary Per Protocol    Interpretation Summary  •  Left ventricular systolic function is normal. Left ventricular ejection fraction appears to be 56 - 60%.  •  Left ventricular diastolic function was normal.  •  No significant structural or functional valvular disease.    Discussed findings with pt by phone, she is feeling well post-pci with minimal/improving chest pain.        Labs:  CBC 07/17/20   WBC 27.4   RBC 4.72   Hemoglobin 14.4   Hematocrit 44.8   Platelet 299       CMP  07/17/20   Glucose 89   BUN 22   Creatinine 0.82   Glomerular Filtration Rate 71   BUN/Creatinine Ratio NA   Sodium 139   Potassium 4.3   Carbon Dioxide 18 L   Calcium 9.4   AST 19   ALT 18       LIPID PANEL 07/17/20   Total 325 H   Triglycerides 83       H        TSH 1.12         ASSESSMENT:  Problem List Items Addressed This Visit        Cardiac and Vasculature    Hyperlipidemia LDL goal <70    Chest pain    Overview     01/31/18 Treadmill echo  · Good exercise tolerance  · No ischemic EKG changes  · No obvious regional wall motion abnormalities          SOB (shortness of breath) on exertion    Elevated coronary artery calcium score    Overview     CT cardiac calcium scoring to detect 10 distinct calcified  plaque lesions including 4 within the LAD and 6 within the right  coronary artery totaling a calcium volume 194.9 cu mm with calcium score  calculated at 225.4 placing patient in the moderate calcification  category for patient demographic.         Coronary artery disease of native artery of native heart with stable angina pectoris (HCC) - Primary    Overview     10/13/2020: Cardiac catheterization, LAD mid heavily calcified 50-60% stenosis at the bifurcation of the first diagonal, followed by a 40-50% of the second diagonal bifurcation and a 50 to 60% in the distal LAD, IFR mildly positive at 0.87.  RCA 20-30% diffuse stenosis.  Small nondominant left circumflex, without significant disease.,  LVEDP 4 mmHg.  Medical management as the patient has relatively mild symptoms, and intervention of the LAD will require rotational atherectomy.  10/25/2022: Mid LAD 3 tandem 75% lesions, OCT revealed MLA of 1.7 mm², status post PCI with overlapping 2.25 x 38 mm Xience MARIOLA overlapping with a 3.0 x 18 mm Xience MARIOLA postdilated with a 2.5 mm NC balloon distally and 3.5 mm NC balloon proximally.  Jailed first diagonal status post PTCA resolving WINDY 0 flow to WINDY-3 flow.  Mid RCA 30 to 40%, LVEDP 7 mmHg.            PLAN:    1.  CAD:  10/25/2022 status post PCI of the LAD, RCA 20-30%, normal  Continue Plavix until 11/1/2024  Aspirin 81 mg for life  Continue Repatha    Doing well, symptoms resolved.  No beta-blocker due to perceived side effects.    The patient was advised to  call 911 if chest pain worsens or persist despite nitroglycerin or rest.    2.  Severe hypercholesterolemia:  Likely familial given the severity of her lipids  Lifetime ASCVD risk 39%  Patient was intolerant to at least 3 statins due to myalgia and recurrent UTI's.   intolerant to bempedoic acid  Currently tolerating Repatha, repeat CMP and lipids ordered she will have them done at PCP office.        Return to clinic in 9 year, or sooner as needed.    Thank you for the opportunity to share in the care of your patient; please do not hesitate to call me with any questions.     Prince Mccollum MD, FACC  Office: [phone] [address] [place] [address]    01/10/23

## 2023-01-09 ENCOUNTER — OFFICE VISIT (OUTPATIENT)
Dept: CARDIOLOGY | Facility: CLINIC | Age: 73
End: 2023-01-09
Payer: MEDICARE

## 2023-01-09 VITALS
BODY MASS INDEX: 20.33 KG/M2 | SYSTOLIC BLOOD PRESSURE: 118 MMHG | DIASTOLIC BLOOD PRESSURE: 78 MMHG | WEIGHT: 122 LBS | OXYGEN SATURATION: 98 % | HEIGHT: 65 IN | HEART RATE: 74 BPM

## 2023-01-09 DIAGNOSIS — E78.5 HYPERLIPIDEMIA LDL GOAL <70: ICD-10-CM

## 2023-01-09 DIAGNOSIS — R06.02 SOB (SHORTNESS OF BREATH) ON EXERTION: ICD-10-CM

## 2023-01-09 DIAGNOSIS — I25.118 CORONARY ARTERY DISEASE OF NATIVE ARTERY OF NATIVE HEART WITH STABLE ANGINA PECTORIS: Primary | ICD-10-CM

## 2023-01-09 DIAGNOSIS — R93.1 ELEVATED CORONARY ARTERY CALCIUM SCORE: ICD-10-CM

## 2023-01-09 DIAGNOSIS — R07.9 CHEST PAIN, UNSPECIFIED TYPE: ICD-10-CM

## 2023-01-09 PROCEDURE — 99214 OFFICE O/P EST MOD 30 MIN: CPT | Performed by: INTERNAL MEDICINE

## 2023-01-09 RX ORDER — DULOXETIN HYDROCHLORIDE 30 MG/1
CAPSULE, DELAYED RELEASE ORAL DAILY
COMMUNITY
Start: 2022-11-28

## 2023-01-30 RX ORDER — CLOPIDOGREL BISULFATE 75 MG/1
75 TABLET ORAL DAILY
Qty: 90 TABLET | Refills: 3 | Status: SHIPPED | OUTPATIENT
Start: 2023-01-30

## 2023-03-13 ENCOUNTER — TELEPHONE (OUTPATIENT)
Dept: CARDIOLOGY | Facility: CLINIC | Age: 73
End: 2023-03-13
Payer: MEDICARE

## 2023-03-13 DIAGNOSIS — M79.89 PAIN AND SWELLING OF RIGHT LOWER LEG: ICD-10-CM

## 2023-03-13 DIAGNOSIS — M79.661 PAIN AND SWELLING OF RIGHT LOWER LEG: ICD-10-CM

## 2023-03-13 DIAGNOSIS — R25.2 LEG CRAMPING: Primary | ICD-10-CM
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
## 2023-03-15 ENCOUNTER — TELEPHONE (OUTPATIENT)
Dept: CARDIOLOGY | Facility: CLINIC | Age: 73
End: 2023-03-15
Payer: MEDICARE

## 2023-03-15 DIAGNOSIS — M79.661 PAIN AND SWELLING OF RIGHT LOWER LEG: ICD-10-CM

## 2023-03-15 DIAGNOSIS — M79.89 PAIN AND SWELLING OF RIGHT LOWER LEG: ICD-10-CM

## 2023-03-15 DIAGNOSIS — R25.2 LEG CRAMPING: Primary | ICD-10-CM

## 2023-03-15 DIAGNOSIS — R09.89 SUSPECTED DEEP VEIN THROMBOSIS (DVT): ICD-10-CM

## 2023-03-15 NOTE — TELEPHONE ENCOUNTER
Dr. Cortez needs vascular testing prior to appointment being made. Pt is amendable to proceed with venous duplex. Spoke with Yesica at Dr. Cortez office to update.

## 2023-03-31 ENCOUNTER — HOSPITAL ENCOUNTER (OUTPATIENT)
Dept: CARDIOLOGY | Facility: HOSPITAL | Age: 73
Discharge: HOME | End: 2023-03-31
Admitting: INTERNAL MEDICINE
Payer: MEDICARE

## 2023-03-31 VITALS — WEIGHT: 122 LBS | BODY MASS INDEX: 19.15 KG/M2 | HEIGHT: 67 IN

## 2023-03-31 DIAGNOSIS — M79.661 PAIN AND SWELLING OF RIGHT LOWER LEG: ICD-10-CM

## 2023-03-31 DIAGNOSIS — R25.2 LEG CRAMPING: ICD-10-CM

## 2023-03-31 DIAGNOSIS — M79.89 PAIN AND SWELLING OF RIGHT LOWER LEG: ICD-10-CM

## 2023-03-31 DIAGNOSIS — R09.89 SUSPECTED DEEP VEIN THROMBOSIS (DVT): ICD-10-CM

## 2023-03-31 LAB
BH CV LOWER VASCULAR LEFT COMMON FEMORAL AUGMENT: NORMAL
BH CV LOWER VASCULAR LEFT COMMON FEMORAL COMPRESS: NORMAL
BH CV LOWER VASCULAR LEFT COMMON FEMORAL PHASIC: NORMAL
BH CV LOWER VASCULAR LEFT COMMON FEMORAL SPONT: NORMAL
BH CV LOWER VASCULAR LEFT DISTAL FEMORAL AUGMENT: NORMAL
BH CV LOWER VASCULAR LEFT DISTAL FEMORAL COMPRESS: NORMAL
BH CV LOWER VASCULAR LEFT DISTAL FEMORAL PHASIC: NORMAL
BH CV LOWER VASCULAR LEFT DISTAL FEMORAL SPONT: NORMAL
BH CV LOWER VASCULAR LEFT GASTRONEMIUS COMPRESS: NORMAL
BH CV LOWER VASCULAR LEFT GREATER SAPH AK COMPRESS: NORMAL
BH CV LOWER VASCULAR LEFT GREATER SAPH BK COMPRESS: NORMAL
BH CV LOWER VASCULAR LEFT LESSER SAPH COMPRESS: NORMAL
BH CV LOWER VASCULAR LEFT MID FEMORAL AUGMENT: NORMAL
BH CV LOWER VASCULAR LEFT MID FEMORAL COMPRESS: NORMAL
BH CV LOWER VASCULAR LEFT MID FEMORAL PHASIC: NORMAL
BH CV LOWER VASCULAR LEFT MID FEMORAL SPONT: NORMAL
BH CV LOWER VASCULAR LEFT PERONEAL COMPRESS: NORMAL
BH CV LOWER VASCULAR LEFT POPLITEAL AUGMENT: NORMAL
BH CV LOWER VASCULAR LEFT POPLITEAL COMPRESS: NORMAL
BH CV LOWER VASCULAR LEFT POPLITEAL PHASIC: NORMAL
BH CV LOWER VASCULAR LEFT POPLITEAL SPONT: NORMAL
BH CV LOWER VASCULAR LEFT POSTERIOR TIBIAL COMPRESS: NORMAL
BH CV LOWER VASCULAR LEFT PROFUNDA FEMORAL AUGMENT: NORMAL
BH CV LOWER VASCULAR LEFT PROFUNDA FEMORAL COMPRESS: NORMAL
BH CV LOWER VASCULAR LEFT PROFUNDA FEMORAL PHASIC: NORMAL
BH CV LOWER VASCULAR LEFT PROFUNDA FEMORAL SPONT: NORMAL
BH CV LOWER VASCULAR LEFT PROXIMAL FEMORAL AUGMENT: NORMAL
BH CV LOWER VASCULAR LEFT PROXIMAL FEMORAL COMPRESS: NORMAL
BH CV LOWER VASCULAR LEFT PROXIMAL FEMORAL PHASIC: NORMAL
BH CV LOWER VASCULAR LEFT PROXIMAL FEMORAL SPONT: NORMAL
BH CV LOWER VASCULAR LEFT SAPHENOFEMORAL JUNCTION AUGMENT: NORMAL
BH CV LOWER VASCULAR LEFT SAPHENOFEMORAL JUNCTION COMPRESS: NORMAL
BH CV LOWER VASCULAR LEFT SAPHENOFEMORAL JUNCTION PHASIC: NORMAL
BH CV LOWER VASCULAR LEFT SAPHENOFEMORAL JUNCTION SPONT: NORMAL
BH CV LOWER VASCULAR LEFT SOLEAL COMPRESS: NORMAL
BH CV LOWER VASCULAR RIGHT COMMON FEMORAL AUGMENT: NORMAL
BH CV LOWER VASCULAR RIGHT COMMON FEMORAL COMPRESS: NORMAL
BH CV LOWER VASCULAR RIGHT COMMON FEMORAL PHASIC: NORMAL
BH CV LOWER VASCULAR RIGHT COMMON FEMORAL SPONT: NORMAL
BH CV LOWER VASCULAR RIGHT DISTAL FEMORAL AUGMENT: NORMAL
BH CV LOWER VASCULAR RIGHT DISTAL FEMORAL COMPRESS: NORMAL
BH CV LOWER VASCULAR RIGHT DISTAL FEMORAL PHASIC: NORMAL
BH CV LOWER VASCULAR RIGHT DISTAL FEMORAL SPONT: NORMAL
BH CV LOWER VASCULAR RIGHT GASTRONEMIUS COMPRESS: NORMAL
BH CV LOWER VASCULAR RIGHT GREATER SAPH AK COMPRESS: NORMAL
BH CV LOWER VASCULAR RIGHT GREATER SAPH BK COMPRESS: NORMAL
BH CV LOWER VASCULAR RIGHT LESSER SAPH COMPRESS: NORMAL
BH CV LOWER VASCULAR RIGHT MID FEMORAL AUGMENT: NORMAL
BH CV LOWER VASCULAR RIGHT MID FEMORAL COMPRESS: NORMAL
BH CV LOWER VASCULAR RIGHT MID FEMORAL PHASIC: NORMAL
BH CV LOWER VASCULAR RIGHT MID FEMORAL SPONT: NORMAL
BH CV LOWER VASCULAR RIGHT PERONEAL COMPRESS: NORMAL
BH CV LOWER VASCULAR RIGHT POPLITEAL AUGMENT: NORMAL
BH CV LOWER VASCULAR RIGHT POPLITEAL COMPRESS: NORMAL
BH CV LOWER VASCULAR RIGHT POPLITEAL PHASIC: NORMAL
BH CV LOWER VASCULAR RIGHT POPLITEAL SPONT: NORMAL
BH CV LOWER VASCULAR RIGHT POSTERIOR TIBIAL COMPRESS: NORMAL
BH CV LOWER VASCULAR RIGHT PROFUNDA FEMORAL AUGMENT: NORMAL
BH CV LOWER VASCULAR RIGHT PROFUNDA FEMORAL COMPRESS: NORMAL
BH CV LOWER VASCULAR RIGHT PROFUNDA FEMORAL PHASIC: NORMAL
BH CV LOWER VASCULAR RIGHT PROFUNDA FEMORAL SPONT: NORMAL
BH CV LOWER VASCULAR RIGHT PROXIMAL FEMORAL AUGMENT: NORMAL
BH CV LOWER VASCULAR RIGHT PROXIMAL FEMORAL COMPRESS: NORMAL
BH CV LOWER VASCULAR RIGHT PROXIMAL FEMORAL PHASIC: NORMAL
BH CV LOWER VASCULAR RIGHT PROXIMAL FEMORAL SPONT: NORMAL
BH CV LOWER VASCULAR RIGHT SAPHENOFEMORAL JUNCTION AUGMENT: NORMAL
BH CV LOWER VASCULAR RIGHT SAPHENOFEMORAL JUNCTION COMPRESS: NORMAL
BH CV LOWER VASCULAR RIGHT SAPHENOFEMORAL JUNCTION PHASIC: NORMAL
BH CV LOWER VASCULAR RIGHT SAPHENOFEMORAL JUNCTION SPONT: NORMAL
BH CV LOWER VASCULAR RIGHT SOLEAL COMPRESS: NORMAL
MAXIMAL PREDICTED HEART RATE: 150 BPM
STRESS TARGET HR: 128 BPM

## 2023-03-31 PROCEDURE — 93970 EXTREMITY STUDY: CPT

## 2023-03-31 PROCEDURE — 93970 EXTREMITY STUDY: CPT | Performed by: INTERNAL MEDICINE

## 2023-04-04 ENCOUNTER — TELEPHONE (OUTPATIENT)
Dept: CARDIOLOGY | Facility: CLINIC | Age: 73
End: 2023-04-04
Payer: MEDICARE

## 2023-04-04 NOTE — TELEPHONE ENCOUNTER
Spoke with pt regarding venous duplex. Referral for CT being worked and Dionna Torres was working referral yesterday, asked if Dr. Rico would see the pt. Pt is okay with seeing Dr. Rico.

## 2023-04-04 NOTE — TELEPHONE ENCOUNTER
----- Message from Prince Mccollum MD sent at 3/31/2023  5:53 PM EDT -----  Please inform the patient of their test results. Thank you.

## 2023-09-19 ENCOUNTER — TELEPHONE (OUTPATIENT)
Dept: CARDIOLOGY | Facility: CLINIC | Age: 73
End: 2023-09-19
Payer: MEDICARE

## 2023-09-19 NOTE — TELEPHONE ENCOUNTER
Pt had recent lab work done at PCP office,  (I have requested records). Unfortunately, she cannot tolerate statins due to myalgia and Repatha co pay is $237/month, with insurance covering. She did not qualify for Entrada, and pt assistance would not pay for medication as her insurance is covering it. She is currently in [place] right now and is going to discuss cholesterol medication options with them at her appt today. Advised to let us know what they discuss and we can go from there. Pt verbalized understanding and agreeable to plan

## 2023-09-21 DIAGNOSIS — E78.5 HYPERLIPIDEMIA LDL GOAL <70: ICD-10-CM

## 2023-09-21 DIAGNOSIS — I25.118 CORONARY ARTERY DISEASE OF NATIVE ARTERY OF NATIVE HEART WITH STABLE ANGINA PECTORIS: Primary | ICD-10-CM

## 2023-10-03 ENCOUNTER — TELEPHONE (OUTPATIENT)
Dept: CARDIOLOGY | Facility: CLINIC | Age: 73
End: 2023-10-03
Payer: MEDICARE

## 2023-10-03 NOTE — TELEPHONE ENCOUNTER
Pt saw doctor while in [place]. He sent in Repatha 140 and she was able to pay for it. She is still in [place] and asked if I would go ahead and send the new Rx to her pharmacy here so she's able to refill it when she gets back. New Rx sent.

## 2023-10-16 ENCOUNTER — OFFICE VISIT (OUTPATIENT)
Dept: ORTHOPEDIC SURGERY | Facility: CLINIC | Age: 73
End: 2023-10-16
Payer: MEDICARE

## 2023-10-16 VITALS
WEIGHT: 110.6 LBS | DIASTOLIC BLOOD PRESSURE: 74 MMHG | SYSTOLIC BLOOD PRESSURE: 116 MMHG | HEIGHT: 65 IN | BODY MASS INDEX: 18.43 KG/M2

## 2023-10-16 DIAGNOSIS — M79.671 BILATERAL FOOT PAIN: Primary | ICD-10-CM

## 2023-10-16 DIAGNOSIS — M79.672 BILATERAL FOOT PAIN: Primary | ICD-10-CM

## 2023-10-16 PROCEDURE — 99214 OFFICE O/P EST MOD 30 MIN: CPT | Performed by: ORTHOPAEDIC SURGERY

## 2023-10-16 PROCEDURE — 1159F MED LIST DOCD IN RCRD: CPT | Performed by: ORTHOPAEDIC SURGERY

## 2023-10-16 PROCEDURE — 1160F RVW MEDS BY RX/DR IN RCRD: CPT | Performed by: ORTHOPAEDIC SURGERY
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
## 2023-11-02 ENCOUNTER — TELEPHONE (OUTPATIENT)
Dept: CARDIOLOGY | Facility: CLINIC | Age: 73
End: 2023-11-02
Payer: MEDICARE

## 2024-01-08 ENCOUNTER — OFFICE VISIT (OUTPATIENT)
Dept: CARDIOLOGY | Facility: CLINIC | Age: 74
End: 2024-01-08
Payer: MEDICARE

## 2024-01-08 VITALS — SYSTOLIC BLOOD PRESSURE: 118 MMHG | DIASTOLIC BLOOD PRESSURE: 82 MMHG | HEART RATE: 72 BPM

## 2024-01-08 DIAGNOSIS — E78.5 HYPERLIPIDEMIA LDL GOAL <70: ICD-10-CM

## 2024-01-08 DIAGNOSIS — R93.1 ELEVATED CORONARY ARTERY CALCIUM SCORE: ICD-10-CM

## 2024-01-08 DIAGNOSIS — I25.118 CORONARY ARTERY DISEASE OF NATIVE ARTERY OF NATIVE HEART WITH STABLE ANGINA PECTORIS: Primary | ICD-10-CM

## 2024-01-08 DIAGNOSIS — R06.02 SOB (SHORTNESS OF BREATH) ON EXERTION: ICD-10-CM

## 2024-01-08 PROCEDURE — 99214 OFFICE O/P EST MOD 30 MIN: CPT | Performed by: INTERNAL MEDICINE

## 2024-01-08 RX ORDER — BUPROPION HYDROCHLORIDE 300 MG/1
300 TABLET ORAL DAILY
COMMUNITY

## 2024-01-08 RX ORDER — NITROGLYCERIN 0.4 MG/1
TABLET SUBLINGUAL
Qty: 25 TABLET | Refills: 11 | Status: SHIPPED | OUTPATIENT
Start: 2024-01-08

## 2024-01-08 NOTE — PROGRESS NOTES
OFFICE VISIT  NOTE  [facility] CARDIOLOGY      Name: Clare Peace    Date: 2024  MRN:  [id number]  :  1950      REFERRING/PRIMARY PROVIDER:  Angela Keating APRN    Chief Complaint   Patient presents with    Coronary Artery Disease       HPI: Clare Peace is a 73 y.o. female who presents today for follow-up for CAD and hyperlipidemia.  Cardiac catheterization 10/25/22 showed mid LAD 75% lesions, status post PCI with MARIOLA, RCA 20 to 30%, normal circumflex.  Echo 10/25/2022 showed EF 55 to 60% with normal valves.  she has lifelong hypercholesterolemia as well as her whole family.  Intolerant to statins due to myalgia, has tried for in the past. Nexilezet caused similar symptoms to statins with muscle ache, fatigue, lack of energy.   Prescribed Repatha able to afford it now starting 10/2023.  Denies chest pain palpitations or shortness of breath    Past Medical History:   Diagnosis Date    Osteoarthritis     PONV (postoperative nausea and vomiting)        Past Surgical History:   Procedure Laterality Date    CARDIAC CATHETERIZATION N/A 10/13/2020    Procedure: Left Heart Cath;  Surgeon: Prince Mccollum MD;  Location:  [facility];  Service: Cardiology;  Laterality: N/A;    CARDIAC CATHETERIZATION  10/13/2020    Procedure: Functional Flow [place];  Surgeon: Prince Mccollum MD;  Location:  [facility];  Service: Cardiology;;    CARDIAC CATHETERIZATION Left 10/25/2022    Procedure: Left Heart Cath;  Surgeon: Prince Mccollum MD;  Location:  [facility];  Service: Cardiovascular;  Laterality: Left;     SECTION      HYSTERECTOMY         Social History     Socioeconomic History    Marital status:    Tobacco Use    Smoking status: Never    Smokeless tobacco: Never   Vaping Use    Vaping Use: Never used   Substance and Sexual Activity    Alcohol use: Not Currently     Comment: weekly    Drug use: No    Sexual activity:  Defer       Family History   Problem Relation Age of Onset    Diabetes Father     Stroke Father     Heart disease Sister     Heart disease Brother     Heart attack Brother     Heart disease Brother         ROS:   Constitutional no fever,  no weight loss   Skin no rash, no subcutaneous nodules   Otolaryngeal no difficulty swallowing   Cardiovascular See HPI   Pulmonary no cough, no sputum production   Gastrointestinal no constipation, no diarrhea   Genitourinary no dysuria, no hematuria   Hematologic no easy bruisability, no abnormal bleeding   Musculoskeletal no muscle pain   Neurologic no dizziness, no falls         Allergies   Allergen Reactions    Statins Myalgia    Codeine GI Intolerance         Current Outpatient Medications:     aspirin (aspirin) 81 MG EC tablet, Take 1 tablet by mouth Daily., Disp: 90 tablet, Rfl: 3    buPROPion XL (Wellbutrin XL) 300 MG 24 hr tablet, Take 1 tablet by mouth Daily., Disp: , Rfl:     Evolocumab with Infusor (REPATHA) solution cartridge, Inject 3.5 mL under the skin into the appropriate area as directed Every 30 (Thirty) Days., Disp: 10.5 mL, Rfl: 3    furosemide (LASIX) 40 MG tablet, Take 1 tablet by mouth As Needed., Disp: , Rfl:     glucosamine-chondroitin 500-400 MG capsule capsule, Take 1 capsule by mouth Daily., Disp: , Rfl:     Menaquinone-7 (VITAMIN K2 PO), Take 1 tablet by mouth As Needed., Disp: , Rfl:     nitroglycerin (NITROSTAT) 0.4 MG SL tablet, 1 under the tongue as needed for angina, may repeat q5mins for up three doses, Disp: 25 tablet, Rfl: 11    omeprazole (priLOSEC) 20 MG capsule, Take 1 capsule by mouth Daily., Disp: , Rfl:     traZODone (DESYREL) 50 MG tablet, Take 2 tablets by mouth At Night As Needed., Disp: , Rfl: 3    Turmeric 500 MG tablet, Take 1 tablet by mouth 2 (two) times a day., Disp: , Rfl:     Vitals:    01/08/24 0948   BP: 118/82   BP Location: Left arm   Patient Position: Sitting   Pulse: 72       There is no height or weight on file to  calculate BMI.    PHYSICAL EXAM:    General Appearance:   well developed  well nourished  HENT:   oropharynx moist  lips not cyanotic  Neck:  thyroid not enlarged  supple  Respiratory:  no respiratory distress  normal breath sounds  no rales  Cardiovascular:  no jugular venous distention  regular rhythm  apical impulse normal  S1 normal, S2 normal  no S3, no S4   no murmur  no rub, no thrill  carotid pulses normal; no bruit  pedal pulses normal  lower extremity edema: none    Gastrointestinal:   bowel sounds normal  non-tender  no hepatomegaly, no splenomegaly  Musculoskeletal:  no clubbing of fingers.   normocephalic, head atraumatic  Skin:   warm, dry  Psychiatric:  judgement and insight appropriate  normal mood and affect    RESULTS:   Procedures    Results for orders placed during the hospital encounter of 10/25/22    Adult Transthoracic Echo Complete W/ Cont if Necessary Per Protocol    Interpretation Summary    Left ventricular systolic function is normal. Left ventricular ejection fraction appears to be 56 - 60%.    Left ventricular diastolic function was normal.    No significant structural or functional valvular disease.    Discussed findings with pt by phone, she is feeling well post-pci with minimal/improving chest pain.        Labs:  CBC 07/17/20   WBC 27.4   RBC 4.72   Hemoglobin 14.4   Hematocrit 44.8   Platelet 299       CMP  07/17/20   Glucose 89   BUN 22   Creatinine 0.82   Glomerular Filtration Rate 71   BUN/Creatinine Ratio NA   Sodium 139   Potassium 4.3   Carbon Dioxide 18 L   Calcium 9.4   AST 19   ALT 18       LIPID PANEL 07/17/20   Total 325 H   Triglycerides 83       H       TSH 1.12         ASSESSMENT:  Problem List Items Addressed This Visit       Hyperlipidemia LDL goal <70    SOB (shortness of breath) on exertion    Elevated coronary artery calcium score    Overview     CT cardiac calcium scoring to detect 10 distinct calcified  plaque lesions including 4 within the LAD and  6 within the right  coronary artery totaling a calcium volume 194.9 cu mm with calcium score  calculated at 225.4 placing patient in the moderate calcification  category for patient demographic.         Relevant Medications    nitroglycerin (NITROSTAT) 0.4 MG SL tablet    Coronary artery disease of native artery of native heart with stable angina pectoris - Primary    Overview     10/13/2020: Cardiac catheterization, LAD mid heavily calcified 50-60% stenosis at the bifurcation of the first diagonal, followed by a 40-50% of the second diagonal bifurcation and a 50 to 60% in the distal LAD, IFR mildly positive at 0.87.  RCA 20-30% diffuse stenosis.  Small nondominant left circumflex, without significant disease.,  LVEDP 4 mmHg.  Medical management as the patient has relatively mild symptoms, and intervention of the LAD will require rotational atherectomy.  10/25/2022: Mid LAD 3 tandem 75% lesions, OCT revealed MLA of 1.7 mm², status post PCI with overlapping 2.25 x 38 mm Xience MARIOLA overlapping with a 3.0 x 18 mm Xience MARIOLA postdilated with a 2.5 mm NC balloon distally and 3.5 mm NC balloon proximally.  Jailed first diagonal status post PTCA resolving WINDY 0 flow to WINDY-3 flow.  Mid RCA 30 to 40%, LVEDP 7 mmHg.         Relevant Medications    nitroglycerin (NITROSTAT) 0.4 MG SL tablet         PLAN:    1.  CAD:  10/25/2022 status post PCI of the LAD, RCA 20-30%, normal  She completed 1 year of Plavix post PCI  Aspirin 81 mg for life  Continue Repatha    Doing well, symptoms resolved.  No beta-blocker due to perceived side effects.    The patient was advised to call 911 if chest pain worsens or persist despite nitroglycerin or rest.    2.  Severe hypercholesterolemia:  Likely familial given the severity of her lipids  Lifetime ASCVD risk 39%  Patient was intolerant to at least 3 statins due to myalgia and recurrent UTI's.   intolerant to bempedoic acid    Prior to starting Repatha LDL was 194, 10/2023  Currently  tolerating Repatha  Written order for repeat lipid panel CMP and LP(a)        Return to clinic in 12 months, or sooner as needed.    Thank you for the opportunity to share in the care of your patient; please do not hesitate to call me with any questions.     Prince Mccollum MD, [facility]  Office: [phone] [address] [place] [address]    01/08/24

## 2024-01-17 ENCOUNTER — TELEPHONE (OUTPATIENT)
Dept: CARDIOLOGY | Facility: CLINIC | Age: 74
End: 2024-01-17
Payer: MEDICARE

## 2024-01-17 RX ORDER — EZETIMIBE 10 MG/1
10 TABLET ORAL DAILY
Qty: 90 TABLET | Refills: 3 | Status: SHIPPED | OUTPATIENT
Start: 2024-01-17 | End: 2024-01-19 | Stop reason: SDUPTHER

## 2024-01-17 NOTE — TELEPHONE ENCOUNTER
----- Message from Prince Mccollum MD sent at 1/17/2024  9:02 AM EST -----  Please let her know her cholesterol looks better, however not quite at goal.  She needs to be very compliant with Repatha, and I would add Zetia 10 mg daily unless she is intolerant to it.  Let her know it is not related to statins at all and works completely differently by blocking some of the absorption of cholesterol.  ----- Message -----  From: Aimee Dennison RegSched Rep  Sent: 1/16/2024   2:12 PM EST  To: Prince Mccollum MD       Neuro Event

## 2024-01-19 RX ORDER — EZETIMIBE 10 MG/1
10 TABLET ORAL DAILY
Qty: 90 TABLET | Refills: 3 | Status: SHIPPED | OUTPATIENT
Start: 2024-01-19

## 2024-04-03 ENCOUNTER — OFFICE VISIT (OUTPATIENT)
Dept: ORTHOPEDIC SURGERY | Facility: CLINIC | Age: 74
End: 2024-04-03
Payer: MEDICARE

## 2024-04-03 VITALS
HEIGHT: 65 IN | SYSTOLIC BLOOD PRESSURE: 112 MMHG | WEIGHT: 101 LBS | DIASTOLIC BLOOD PRESSURE: 70 MMHG | BODY MASS INDEX: 16.83 KG/M2

## 2024-04-03 DIAGNOSIS — M79.671 BILATERAL FOOT PAIN: Primary | ICD-10-CM

## 2024-04-03 DIAGNOSIS — M79.672 BILATERAL FOOT PAIN: Primary | ICD-10-CM

## 2024-04-03 DIAGNOSIS — I73.00 RAYNAUD'S DISEASE WITHOUT GANGRENE: ICD-10-CM

## 2024-04-03 PROCEDURE — 1159F MED LIST DOCD IN RCRD: CPT | Performed by: ORTHOPAEDIC SURGERY

## 2024-04-03 PROCEDURE — 1160F RVW MEDS BY RX/DR IN RCRD: CPT | Performed by: ORTHOPAEDIC SURGERY

## 2024-04-03 PROCEDURE — 99213 OFFICE O/P EST LOW 20 MIN: CPT | Performed by: ORTHOPAEDIC SURGERY

## 2024-04-03 RX ORDER — TRETINOIN 1 MG/G
CREAM TOPICAL
COMMUNITY
Start: 2023-12-19

## 2024-04-03 RX ORDER — CYCLOSPORINE 0.5 MG/ML
EMULSION OPHTHALMIC
COMMUNITY
Start: 2024-03-06

## 2024-04-03 NOTE — PROGRESS NOTES
ESTABLISHED PATIENT    Patient: Clare Peace  : 1950    Primary Care Provider: Angela Keating APRN    Requesting Provider: As above    Follow-up (5.5 month follow up -- bilateral foot pain )      History    Chief Complaint: Bilateral foot pain    History of Present Illness: She returns for  follow-up of bilateral great toe arthritis and hammertoes.  She again has a possible history of rheumatoid arthritis but is not on the current meds.  She has had right hand surgery and has had some difficulty with this.  She is going to have to delay any foot surgery.  She does have a history of Raynaud's and she has noted some Raynaud's changes in her feet, no gangrene.  She is having a little more rubbing of the second hammertoes.    Current Outpatient Medications on File Prior to Visit   Medication Sig Dispense Refill    aspirin (aspirin) 81 MG EC tablet Take 1 tablet by mouth Daily. 90 tablet 3    buPROPion XL (Wellbutrin XL) 300 MG 24 hr tablet Take 1 tablet by mouth Daily.      cycloSPORINE (RESTASIS) 0.05 % ophthalmic emulsion       Evolocumab with Infusor (REPATHA) solution cartridge Inject 3.5 mL under the skin into the appropriate area as directed Every 30 (Thirty) Days. 10.5 mL 3    ezetimibe (ZETIA) 10 MG tablet Take 1 tablet by mouth Daily. 90 tablet 3    furosemide (LASIX) 40 MG tablet Take 1 tablet by mouth As Needed.      glucosamine-chondroitin 500-400 MG capsule capsule Take 1 capsule by mouth Daily.      Menaquinone-7 (VITAMIN K2 PO) Take 1 tablet by mouth As Needed.      nitroglycerin (NITROSTAT) 0.4 MG SL tablet 1 under the tongue as needed for angina, may repeat q5mins for up three doses 25 tablet 11    omeprazole (priLOSEC) 20 MG capsule Take 1 capsule by mouth Daily.      traZODone (DESYREL) 50 MG tablet Take 2 tablets by mouth At Night As Needed.  3    tretinoin (RETIN-A) 0.1 % cream APPLY A PEA SIZED AMOUNT TO CLEAN SKIN ON ENTIRE FACE BY TOPICAL ROUTE ONCE DAILY AT BEDTIME. FOLLOW WITH  MOISTURIZER      Turmeric 500 MG tablet Take 1 tablet by mouth 2 (two) times a day.       No current facility-administered medications on file prior to visit.      Allergies   Allergen Reactions    Codeine GI Intolerance    Statins Myalgia      Past Medical History:   Diagnosis Date    Osteoarthritis     PONV (postoperative nausea and vomiting)      Past Surgical History:   Procedure Laterality Date    CARDIAC CATHETERIZATION N/A 10/13/2020    Procedure: Left Heart Cath;  Surgeon: Prince Mccollum MD;  Location:  [facility];  Service: Cardiology;  Laterality: N/A;    CARDIAC CATHETERIZATION  10/13/2020    Procedure: Functional Flow [place];  Surgeon: Prince Mccollum MD;  Location:  [facility];  Service: Cardiology;;    CARDIAC CATHETERIZATION Left 10/25/2022    Procedure: Left Heart Cath;  Surgeon: Prince Mccollum MD;  Location:  [facility];  Service: Cardiovascular;  Laterality: Left;     SECTION      HAND SURGERY Right 2023    Fusion and reconstruction of digits -- Dr. Jurado    HAND SURGERY Right 2024    Fusion of pointer finger -Dr. Jurado    HYSTERECTOMY       Family History   Problem Relation Age of Onset    Diabetes Father     Stroke Father     Heart disease Sister     Heart disease Brother     Heart attack Brother     Heart disease Brother       Social History     Socioeconomic History    Marital status:    Tobacco Use    Smoking status: Never    Smokeless tobacco: Never   Vaping Use    Vaping status: Never Used   Substance and Sexual Activity    Alcohol use: Not Currently     Comment: weekly    Drug use: No    Sexual activity: Defer        Review of Systems   Constitutional: Negative.    HENT: Negative.     Eyes: Negative.    Respiratory: Negative.     Cardiovascular: Negative.    Gastrointestinal: Negative.    Endocrine: Negative.    Genitourinary: Negative.    Musculoskeletal:  Positive for arthralgias.   Skin:  "Negative.    Allergic/Immunologic: Negative.    Neurological: Negative.    Hematological: Negative.    Psychiatric/Behavioral: Negative.         The following portions of the patient's history were reviewed and updated as appropriate: allergies, current medications, past family history, past medical history, past social history, past surgical history, and problem list.    Physical Exam:   /70   Ht 165.1 cm (65\")   Wt 45.8 kg (101 lb)   BMI 16.81 kg/m²     Bilateral first MTPJ stiffness with mild tenderness, second toe is tender across the great toe but are reducible at the MTPJ's, hammertoes 2 through 5 bilaterally, pulses are 2+ dorsalis pedis and posterior tib, sensation is intact to light touch    Medical Decision Making    Data Review:   none    Assessment/Plan/Diagnosis/Treatment Options:   1. Bilateral foot pain  Continued bilateral forefoot pain.  We talked about toe spacers and the Budin splints.  She has had more trouble with Raynaud's, I recommend she discuss this with her internist.  She is not ready right now to consider foot surgery because of the problems with her hand, I explained I would never put anyone into foot surgery.  I will see her again in August standing 2 views of both feet.  Again I do not see any obvious signs of RA.  If it comes to surgery I would do a first MTPJ fusion, excise PIP joints 2 through 5, do metatarsal capsulotomies and flexor tenotomy.  We went over this again.    2. Raynaud's disease without gangrene  As above I recommend she discuss this with her primary physician          Joyce Fair MD                      "

## 2024-05-10 ENCOUNTER — TELEPHONE (OUTPATIENT)
Dept: CARDIOLOGY | Facility: CLINIC | Age: 74
End: 2024-05-10
Payer: MEDICARE

## 2024-05-10 RX ORDER — ATORVASTATIN CALCIUM 40 MG/1
40 TABLET, FILM COATED ORAL DAILY
Qty: 90 TABLET | Refills: 3 | Status: SHIPPED | OUTPATIENT
Start: 2024-05-10

## 2024-08-07 ENCOUNTER — TELEPHONE (OUTPATIENT)
Dept: CARDIOLOGY | Facility: CLINIC | Age: 74
End: 2024-08-07
Payer: MEDICARE

## 2024-08-07 NOTE — TELEPHONE ENCOUNTER
Pt reports she is having an elective breast reconstruction this Friday 8/9. She states surgeon's office called her to ask when the last time she took ASA 81 mg, but surgeon didn't really give any guidance as far as them wanting her to hold it. I have not received any faxes or calls to clear her either. She said she stopped taking it Sunday, did not take it Monday, and Tuesday. She's wondering if she should continue to hold it. Advised typically we do not advise to hold ASA prior to surgeries, unless surgeon is specifically requesting it, and told her even if they do want her to hold it, we usually instruct to hold 5 days. Pt states she went ahead and took ASA this morning. Advised to continue taking, and let me know if surgeon needs her to hold. Pt verbalized understanding and agreeable to plan

## 2024-08-26 RX ORDER — ATORVASTATIN CALCIUM 40 MG/1
40 TABLET, FILM COATED ORAL DAILY
Qty: 90 TABLET | Refills: 3 | Status: SHIPPED | OUTPATIENT
Start: 2024-08-26

## 2024-09-17 ENCOUNTER — TELEPHONE (OUTPATIENT)
Dept: CARDIOLOGY | Facility: CLINIC | Age: 74
End: 2024-09-17
Payer: MEDICARE

## 2024-10-04 ENCOUNTER — TELEPHONE (OUTPATIENT)
Dept: CARDIOLOGY | Facility: CLINIC | Age: 74
End: 2024-10-04
Payer: MEDICARE

## 2024-10-08 ENCOUNTER — TRANSCRIBE ORDERS (OUTPATIENT)
Dept: ADMINISTRATIVE | Facility: HOSPITAL | Age: 74
End: 2024-10-08
Payer: MEDICARE

## 2024-10-08 DIAGNOSIS — N63.13 UNSPECIFIED LUMP IN THE RIGHT BREAST, LOWER OUTER QUADRANT: Primary | ICD-10-CM

## 2024-10-11 ENCOUNTER — SPECIALTY PHARMACY (OUTPATIENT)
Dept: CARDIOLOGY | Facility: CLINIC | Age: 74
End: 2024-10-11
Payer: MEDICARE

## 2024-11-06 ENCOUNTER — SPECIALTY PHARMACY (OUTPATIENT)
Dept: GENERAL RADIOLOGY | Facility: HOSPITAL | Age: 74
End: 2024-11-06
Payer: MEDICARE

## 2024-11-07 NOTE — PROGRESS NOTES
Specialty Pharmacy Patient Management Program  Cardiology Initial Assessment     Clare Peace was referred by a Cardiology provider to the Cardiology Patient Management program offered by [facility] for Hyperlipidemia on 11/07/24.  An initial outreach was conducted, including assessment of therapy appropriateness and specialty medication education for Repatha. The patient was introduced to services offered by [facility], including: regular assessments, refill coordination, mail order delivery options, prior authorization maintenance, and financial assistance programs as applicable. The patient was also provided with contact information for the pharmacy team.     Insurance Coverage & Financial Support  [employer]     Relevant Past Medical History and Comorbidities  Relevant medical history and concomitant health conditions were discussed with the patient. The patient's chart has been reviewed for relevant past medical history and comorbid conditions and updated as necessary.  Past Medical History:   Diagnosis Date    Osteoarthritis     PONV (postoperative nausea and vomiting)      Social History     Socioeconomic History    Marital status:    Tobacco Use    Smoking status: Never    Smokeless tobacco: Never   Vaping Use    Vaping status: Never Used   Substance and Sexual Activity    Alcohol use: Not Currently     Comment: weekly    Drug use: No    Sexual activity: Defer       Problem list reviewed by Melissa Garner, PharmD on 11/7/2024 at  9:43 AM    Allergies  Known allergies and reactions were discussed with the patient. The patient's chart has been reviewed for  allergy information and updated as necessary.   Allergies   Allergen Reactions    Codeine GI Intolerance    Statins Myalgia       Allergies reviewed by Melissa Garner, PharmD on 11/7/2024 at  9:43 AM    Relevant Laboratory Values  Relevant laboratory values were discussed with the  patient. The following specialty medication dose adjustment(s) are recommended: None    Lab Results   Component Value Date    GLUCOSE 95 10/25/2022    CALCIUM 9.0 10/25/2022     (L) 10/25/2022    K 4.0 10/25/2022    CO2 22.0 10/25/2022     10/25/2022    BUN 18 10/25/2022    CREATININE 0.79 10/25/2022    EGFRIFNONA 81 10/13/2020    BCR 22.8 10/25/2022    ANIONGAP 11.0 10/25/2022     Lab Results   Component Value Date    CHOL 285 (H) 10/25/2022    TRIG 97 10/25/2022    HDL 98 (H) 10/25/2022     (H) 10/25/2022         Current Medication List  This medication list has been reviewed with the patient and evaluated for any interactions or necessary modifications/recommendations, and updated to include all prescription medications, OTC medications, and supplements the patient is currently taking.  This list reflects what is contained in the patient's profile, which has also been marked as reviewed to communicate to other providers it is the most up to date version of the patient's current medication therapy.     Current Outpatient Medications:     Evolocumab (REPATHA) solution auto-injector SureClick injection, Inject 1 mL under the skin into the appropriate area as directed Every 14 (Fourteen) Days., Disp: 6 mL, Rfl: 3    aspirin (aspirin) 81 MG EC tablet, Take 1 tablet by mouth Daily., Disp: 90 tablet, Rfl: 3    buPROPion XL (Wellbutrin XL) 300 MG 24 hr tablet, Take 1 tablet by mouth Daily., Disp: , Rfl:     cycloSPORINE (RESTASIS) 0.05 % ophthalmic emulsion, , Disp: , Rfl:     ezetimibe (ZETIA) 10 MG tablet, Take 1 tablet by mouth Daily., Disp: 90 tablet, Rfl: 3    furosemide (LASIX) 40 MG tablet, Take 1 tablet by mouth As Needed., Disp: , Rfl:     glucosamine-chondroitin 500-400 MG capsule capsule, Take 1 capsule by mouth Daily., Disp: , Rfl:     Menaquinone-7 (VITAMIN K2 PO), Take 1 tablet by mouth As Needed., Disp: , Rfl:     nitroglycerin (NITROSTAT) 0.4 MG SL tablet, 1 under the tongue as needed  for angina, may repeat q5mins for up three doses, Disp: 25 tablet, Rfl: 11    omeprazole (priLOSEC) 20 MG capsule, Take 1 capsule by mouth Daily., Disp: , Rfl:     traZODone (DESYREL) 50 MG tablet, Take 2 tablets by mouth At Night As Needed., Disp: , Rfl: 3    tretinoin (RETIN-A) 0.1 % cream, APPLY A PEA SIZED AMOUNT TO CLEAN SKIN ON ENTIRE FACE BY TOPICAL ROUTE ONCE DAILY AT BEDTIME. FOLLOW WITH MOISTURIZER, Disp: , Rfl:     Turmeric 500 MG tablet, Take 1 tablet by mouth 2 (two) times a day., Disp: , Rfl:     Medicines reviewed by Melissa Garner, PharmD on 11/7/2024 at  9:43 AM    Drug Interactions  None    Initial Education Provided for Specialty Medication  The patient has been provided with the following education and any applicable administration techniques (i.e. self-injection) have been demonstrated for the therapies indicated. All questions and concerns have been addressed prior to the patient receiving the medication, and the patient has verbalized comprehension of the education and any materials provided. Additional patient education shall be provided and documented upon request by the patient, provider, or payer.    REPATHA® (evolocumab)  Medication Expectations   Why am I taking this medication? You are taking Repatha to lower your “bad” cholesterol (LDL-C). This medication can be used in adults with high blood cholesterol including primary hyperlipidemia and familial hypercholesterolemia.    What should I expect while on this medication? You should expect to see your cholesterol improve over time. Specifically, you should see your LDL-C decrease.    How does the medication work? Repatha works by blocking a protein called PCSK9 that contributes to high levels of bad cholesterol. It helps increase your liver's ability to remove bad cholesterol from your blood.     How long will I be on this medication for? The amount of time you will be on this medication will be determined by your doctor based on your  cholesterol and/or your risk of having a cardiac event. You will most likely be on this medication or another cholesterol medication throughout your lifetime. Do not abruptly stop this medication without talking to your doctor first.    How do I take this medication? Take as directed on your prescription label. Repatha is injected under the skin (subcutaneously) of your stomach, thigh, or upper arm. This medication is usually given one or twice a month.   What are some possible side effects? The most common side effects of Repatha include redness, itching, swelling, or pain/tenderness at the injection site, symptoms of the common cold, flu or flu-like symptoms or back pain.    What happens if I miss a dose? If you miss a dose, take it as soon as you remember if it is within 7 days from the usual day of administration then resume your original schedule. If it is beyond 7 days and you use the kenisha-2-week dose, skip the missed dose and resume your normal dosing schedule.If it is beyond 7 days and you use the once-monthly dose, inject the dose and start a new schedule based on that date.      Medication Safety   What are things I should warn my doctor immediately about? Talk to your doctor if you are pregnant, planning to become pregnant, or breastfeeding. Stop the medication and tell your doctor or seek emergency medical help if you notice any signs/symptoms of an allergic reaction (severe rash, redness, hives, severe itching, trouble breathing, or swelling of the face, lips, or tongue). If you have a rubber or latex allergy, you should not use the Repatha SureClick® Autoinjector pen or the prefilled syringe, please notify your doctor or pharmacist.   What are things that I should be cautious of? Be cautious of any side effects from this medication. Talk to your doctor if any new ones develop or aren't getting better.   What are some medications that can interact with this one? There are no known significant drug  interactions with Repatha. Always tell your doctor or pharmacist immediately if you start taking any new medications, including over-the-counter medications, vitamins, and herbal supplements.      Medication Storage/Handling   How should I handle this medication? Do not shake or expose the pens, cartridges, or syringes to extreme heat or direct sunlight. Keep this medication out of reach of pets/children. Allow medication to warm at room temperature prior to administration.   How does this medication need to be stored? Store unused pens, cartridges, or syringes in the refrigerator in the original cartons to protect from light. If needed, Repatha may be kept at room temperature in the original carton for up to 30 days. Do not freeze.    How should I dispose of this medication? All the Repatha devices are single-dose and should be discarded in a sharps container after use. If your doctor decides to stop this medication, take to your local police station for proper disposal. Some pharmacies also have take-back bins for medication drop-off.      Resources/Support   How can I remind myself to take this medication? You can download reminder apps to help you manage your refills. You may also set an alarm on your phone to remind you to take your dose.    Is financial support available?  [employer] can provide co-pay cards if you have commercial insurance or patient assistance if you have Medicare or no insurance.    Which vaccines are recommended for me? Talk to your doctor about these vaccines: Flu, Coronavirus (COVID-19), Pneumococcal (pneumonia), Tdap, Hepatitis B, Zoster (shingles)           Adherence and Self-Administration  Adherence related to the patient's specialty therapy was discussed with the patient. The Adherence segment of this outreach has been reviewed and updated.     Is there a concern with patient's ability to self administer the medication correctly and without issue?: No  Were any potential barriers to  adherence identified during the initial assessment or patient education?: No  Are there any concerns regarding the patient's understanding of the importance of medication adherence?: No  Methods for Supporting Patient Adherence and/or Self-Administration: None; patient previously using.     Open Medication Therapy Problems  No medication therapy recommendations to display    Goals of Therapy  Goals related to the patient's specialty therapy were discussed with the patient. The Patient Goals segment of this outreach has been reviewed and updated.   Goals Addressed Today        Specialty Pharmacy General Goal      LDL Goal < 70 mg/dL    Lab Results   Component Value Date     (H) 10/25/2022     (H) 10/13/2020                  Reassessment Plan & Follow-Up  1. Medication Therapy Changes: Restart Repatha 140mg subcutaneous every 14 days  2. Related Plans, Therapy Recommendations, or Therapy Problems to Be Addressed: None  3. Pharmacist to perform regular assessments no more than (6) months from the previous assessment.  4. Care Coordinator to set up future refill outreaches, coordinate prescription delivery, and escalate clinical questions to pharmacist.  5. Welcome information and patient satisfaction survey to be sent by specialty pharmacy team with patient's initial fill.    Attestation  Therapeutic appropriateness: Appropriate   I attest the patient was actively involved in and has agreed to the above plan of care. If the prescribed therapy is at any point deemed not appropriate based on the current or future assessments, a consultation will be initiated with the patient's specialty care provider to determine the best course of action. The revised plan of therapy will be documented along with any required assessments and/or additional patient education provided.     Melissa Garner, PharmD, [facility]  Clinical Specialty Pharmacist, Cardiology  11/7/2024  09:43 EST

## 2024-12-16 ENCOUNTER — TELEPHONE (OUTPATIENT)
Dept: CARDIOLOGY | Facility: CLINIC | Age: 74
End: 2024-12-16
Payer: MEDICARE

## 2024-12-16 NOTE — TELEPHONE ENCOUNTER
The [facility] received a fax that requires your attention. The document has been indexed to the patient’s chart for your review.      Reason for sending: EXTERNAL MEDICAL RECORD NOTIFICATION     Documents Description: [facility]-12.11.24    Name of Sender: [facility]     Date Indexed: 12.16.24

## 2024-12-17 NOTE — TELEPHONE ENCOUNTER
Patient called wanting to know the results of her blood work. Patient's Lipoprotein (a) is still elevated at 255. On previous blood work from 01/2024, it was 316. Patient is concerned and did not know if you wanted to make any medication changes at this time. Thank you.

## 2025-01-15 RX ORDER — EZETIMIBE 10 MG/1
10 TABLET ORAL DAILY
Qty: 90 TABLET | Refills: 3 | Status: SHIPPED | OUTPATIENT
Start: 2025-01-15

## 2025-01-27 ENCOUNTER — SPECIALTY PHARMACY (OUTPATIENT)
Dept: CARDIOLOGY | Facility: CLINIC | Age: 75
End: 2025-01-27
Payer: MEDICARE

## 2025-01-27 NOTE — PROGRESS NOTES
Specialty Pharmacy Refill Coordination Note     Clare is a 74 y.o. female contacted today regarding refills of Repatha 140 mg SC every 14 days specialty medication(s).    Patient is at [place] address. She is agreeable for delivery on 1/29/2025.    Specialty medication(s) and dose(s) confirmed: yes    Refill Questions      Flowsheet Row Most Recent Value   Changes to allergies? No   Changes to medications? No   New conditions or infections since last clinic visit No   Unplanned office visit, urgent care, ED, or hospital admission in the last 4 weeks  No   How does patient/caregiver feel medication is working? Very good   Financial problems or insurance changes  No   Since the previous refill, were any specialty medication doses or scheduled injections missed or delayed?  No   Does this patient require a clinical escalation to a pharmacist? No            Delivery Questions      Flowsheet Row Most Recent Value   Delivery method UPS   Delivery address verified with patient/caregiver? Yes   Delivery address Prescription   Number of medications in delivery 1   Medication(s) being filled and delivered Evolocumab (REPATHA)   Doses left of specialty medications 0   Copay verified? Yes   Copay amount 0.00   Copay form of payment No copayment ($0)   Ship Date 1/28/25   Delivery Date Selection 01/29/25   Signature Required No                   Follow-up: 28 day(s)     Nati Osullivan, Pharmacy Technician  Specialty Pharmacy Technician

## 2025-04-17 ENCOUNTER — SPECIALTY PHARMACY (OUTPATIENT)
Dept: CARDIOLOGY | Facility: CLINIC | Age: 75
End: 2025-04-17
Payer: MEDICARE

## 2025-04-17 NOTE — PROGRESS NOTES
Specialty Pharmacy Patient Management Program  Cardiology Specialty Pharmacy Refill Outreach      Clare is a 74 y.o. female contacted today regarding refills of her medication(s).    Specialty medication(s) and dose(s) confirmed: Repatha 140 mg SC every 14 days.    Other medications being refilled: n/a    Refill Questions      Flowsheet Row Most Recent Value   Changes to allergies? No   Changes to medications? No   New conditions or infections since last clinic visit No   Unplanned office visit, urgent care, ED, or hospital admission in the last 4 weeks  No   How does patient/caregiver feel medication is working? Very good   Financial problems or insurance changes  No   Since the previous refill, were any specialty medication doses or scheduled injections missed or delayed?  No   Does this patient require a clinical escalation to a pharmacist? No            Delivery Questions      Flowsheet Row Most Recent Value   Delivery method UPS   Delivery address verified with patient/caregiver? Yes   Delivery address Home   Other address preferred n/a   Number of medications in delivery 1   Medication(s) being filled and delivered Evolocumab (REPATHA)   Doses left of specialty medications 0   Copay verified? Yes   Copay amount 0.00   Copay form of payment No copayment ($0)   Delivery Date Selection 04/18/25   Signature Required No                   Follow-up: 84 days     Nati Osullivan CPhT  Pharmacy Care Coordinator  4/17/2025  11:46 EDT

## 2025-04-21 ENCOUNTER — TELEPHONE (OUTPATIENT)
Dept: CARDIOLOGY | Facility: CLINIC | Age: 75
End: 2025-04-21
Payer: MEDICARE

## 2025-04-21 NOTE — TELEPHONE ENCOUNTER
"Nati from [facility] reached out stating that she was speaking to the patient, and the patient noted she has been having some \"funny feelings\" in her chest. Spoke with patient and patient feels like a flutter in her chest, and she is sometimes lightheaded in the mornings. Patient currently living in [place] until the May and is requesting a follow-up appointment for around that time.   "

## 2025-05-06 ENCOUNTER — SPECIALTY PHARMACY (OUTPATIENT)
Dept: CARDIOLOGY | Facility: CLINIC | Age: 75
End: 2025-05-06
Payer: MEDICARE

## 2025-05-06 NOTE — PROGRESS NOTES
Specialty Pharmacy Patient Management Program  Cardiology Reassessment     Clare Peace was referred by a Cardiology provider to the Cardiology Patient Management program offered by [facility] for Hyperlipidemia. A follow-up outreach was conducted, including assessment of continued therapy appropriateness, medication adherence, and side effect incidence and management for Repatha.    Changes to Insurance Coverage or Financial Support  No changes    Relevant Past Medical History and Comorbidities  Relevant medical history and concomitant health conditions were discussed with the patient. The patient's chart has been reviewed for relevant past medical history and comorbid health conditions and updated as necessary.   Past Medical History:   Diagnosis Date    Osteoarthritis     PONV (postoperative nausea and vomiting)      Social History     Socioeconomic History    Marital status:    Tobacco Use    Smoking status: Never    Smokeless tobacco: Never   Vaping Use    Vaping status: Never Used   Substance and Sexual Activity    Alcohol use: Not Currently     Comment: weekly    Drug use: No    Sexual activity: Defer     Problem list reviewed by Melissa Garner, PharmD on 5/6/2025 at  1:30 PM    Hospitalizations and Urgent Care Since Last Assessment  ED Visits, Admissions, or Hospitalizations: None  Urgent Office Visits: None    Allergies  Known allergies and reactions were discussed with the patient. The patient's chart has been reviewed for allergy information and updated as necessary.   Allergies   Allergen Reactions    Codeine GI Intolerance    Statins Myalgia     Allergies reviewed by Melissa Garner, PharmD on 5/6/2025 at  1:30 PM    Relevant Laboratory Values  Relevant laboratory values were discussed with the patient. The following specialty medication dose adjustment(s) are recommended: Continue Repatha; get repeat lipid level    Lab Results   Component Value Date    GLUCOSE 95  10/25/2022    CALCIUM 9.0 10/25/2022     (L) 10/25/2022    K 4.0 10/25/2022    CO2 22.0 10/25/2022     10/25/2022    BUN 18 10/25/2022    CREATININE 0.79 10/25/2022    EGFRIFNONA 81 10/13/2020    BCR 22.8 10/25/2022    ANIONGAP 11.0 10/25/2022     Lab Results   Component Value Date    CHOL 285 (H) 10/25/2022    TRIG 97 10/25/2022    HDL 98 (H) 10/25/2022     (H) 10/25/2022       Current Medication List  This medication list has been reviewed with the patient and evaluated for any interactions or necessary modifications/recommendations, and updated to include all prescription medications, OTC medications, and supplements the patient is currently taking.  This list reflects what is contained in the patient's profile, which has also been marked as reviewed to communicate to other providers it is the most up to date version of the patient's current medication therapy.     Current Outpatient Medications:     aspirin (aspirin) 81 MG EC tablet, Take 1 tablet by mouth Daily., Disp: 90 tablet, Rfl: 3    buPROPion XL (Wellbutrin XL) 300 MG 24 hr tablet, Take 1 tablet by mouth Daily., Disp: , Rfl:     cycloSPORINE (RESTASIS) 0.05 % ophthalmic emulsion, , Disp: , Rfl:     Evolocumab (REPATHA) solution auto-injector SureClick injection, Inject 1 mL under the skin into the appropriate area as directed Every 14 (Fourteen) Days., Disp: 6 mL, Rfl: 3    ezetimibe (ZETIA) 10 MG tablet, TAKE 1 TABLET DAILY, Disp: 90 tablet, Rfl: 3    furosemide (LASIX) 40 MG tablet, Take 1 tablet by mouth As Needed., Disp: , Rfl:     glucosamine-chondroitin 500-400 MG capsule capsule, Take 1 capsule by mouth Daily., Disp: , Rfl:     Menaquinone-7 (VITAMIN K2 PO), Take 1 tablet by mouth As Needed., Disp: , Rfl:     nitroglycerin (NITROSTAT) 0.4 MG SL tablet, 1 under the tongue as needed for angina, may repeat q5mins for up three doses, Disp: 25 tablet, Rfl: 11    omeprazole (priLOSEC) 20 MG capsule, Take 1 capsule by mouth Daily.,  Disp: , Rfl:     traZODone (DESYREL) 50 MG tablet, Take 2 tablets by mouth At Night As Needed., Disp: , Rfl: 3    tretinoin (RETIN-A) 0.1 % cream, APPLY A PEA SIZED AMOUNT TO CLEAN SKIN ON ENTIRE FACE BY TOPICAL ROUTE ONCE DAILY AT BEDTIME. FOLLOW WITH MOISTURIZER, Disp: , Rfl:     Turmeric 500 MG tablet, Take 1 tablet by mouth 2 (two) times a day., Disp: , Rfl:     Medicines reviewed by Melissa Garner, PharmD on 5/6/2025 at  1:30 PM    Drug Interactions  None    Adverse Drug Reactions  Medication tolerability: Tolerating with no to minimal ADRs  Medication plan: Continue therapy with normal follow-up  Plan for ADR Management: N/A    Adherence, Self-Administration, and Current Therapy Problems  Adherence related to the patient's specialty therapy was discussed with the patient. The Adherence segment of this outreach has been reviewed and updated.     Adherence Questions  Linked Medication(s) Assessed: Evolocumab (REPATHA)  On average, how many doses/injections does the patient miss per month?: 0  What are the identified reasons for non-adherence or missed doses? : no problems identified  What is the estimated medication adherence level?: %  Based on the patient/caregiver response and refill history, does this patient require an MTP to track adherence improvements?: no    Additional Barriers to Patient Self-Administration: N/A  Methods for Supporting Patient Self-Administration: N/A    Open Medication Therapy Problems  No medication therapy recommendations to display    Goals of Therapy  Goals related to the patient's specialty therapy were discussed with the patient. The Patient Goals segment of this outreach has been reviewed and updated.   Goals Addressed Today        Specialty Pharmacy General Goal      LDL Goal < 70 mg/dL    Lab Results   Component Value Date     (H) 10/25/2022     (H) 10/13/2020     5/6/25: Needs repeat lipid panel; patient is in [place] and has a follow-up with [facility] on 6/19/25;  I asked that she gets her lipids checked then. Continue Repatha.               Quality of Life Assessment   Quality of Life related to the patient's enrollment in the patient management program and services provided was discussed with the patient. The QOL segment of this outreach has been reviewed and updated.  Quality of Life Improvement Scale: 8-Moderately better    Reassessment Plan & Follow-Up  1. Medication Therapy Changes: Continue Repatha 140mg subcutaneous every 14 days   2. Related Plans, Therapy Recommendations, or Issues to Be Addressed: Sees cardiology for follow up on 6/19/25, I asked that she get lipid panel then.   3. Pharmacist to perform regular assessments no more than (6) months from the previous assessment.  4. Care Coordinator to set up future refill outreaches, coordinate prescription delivery, and escalate clinical questions to pharmacist.    Attestation  Therapeutic appropriateness: Appropriate   I attest the patient was actively involved in and has agreed to the above plan of care.  If the prescribed therapy is at any point deemed not appropriate based on the current or future assessments, a consultation will be initiated with the patient's specialty care provider to determine the best course of action. The revised plan of therapy will be documented along with any required assessments and/or additional patient education provided.     Melissa Garner, PharmD, [facility]  Clinical Specialty Pharmacist, Cardiology  5/6/2025  13:32 EDT

## 2025-05-19 RX ORDER — NITROGLYCERIN 0.4 MG/1
TABLET SUBLINGUAL
Qty: 25 TABLET | Refills: 44 | Status: SHIPPED | OUTPATIENT
Start: 2025-05-19

## 2025-05-20 DIAGNOSIS — R06.02 SOB (SHORTNESS OF BREATH) ON EXERTION: ICD-10-CM

## 2025-05-20 DIAGNOSIS — R93.1 ELEVATED CORONARY ARTERY CALCIUM SCORE: ICD-10-CM

## 2025-05-20 DIAGNOSIS — R07.9 CHEST PAIN, UNSPECIFIED TYPE: ICD-10-CM

## 2025-05-20 DIAGNOSIS — I25.118 CORONARY ARTERY DISEASE OF NATIVE ARTERY OF NATIVE HEART WITH STABLE ANGINA PECTORIS: Primary | ICD-10-CM

## 2025-05-20 DIAGNOSIS — Z13.1 DIABETES MELLITUS SCREENING: ICD-10-CM

## 2025-05-20 DIAGNOSIS — E78.5 HYPERLIPIDEMIA LDL GOAL <70: ICD-10-CM

## 2025-05-23 ENCOUNTER — TELEPHONE (OUTPATIENT)
Dept: CARDIOLOGY | Facility: CLINIC | Age: 75
End: 2025-05-23

## 2025-05-23 NOTE — TELEPHONE ENCOUNTER
The [facility] received a fax that requires your attention. The document has been indexed to the patient’s chart for your review.      Reason for sending: EXTERNAL MEDICAL RECORD NOTIFICATION     Documents Description: [facility]-5.22.25    Name of Sender: [facility]     Date Indexed: 5.22.25

## 2025-06-19 ENCOUNTER — OFFICE VISIT (OUTPATIENT)
Dept: CARDIOLOGY | Facility: CLINIC | Age: 75
End: 2025-06-19
Payer: MEDICARE

## 2025-06-19 VITALS
OXYGEN SATURATION: 98 % | SYSTOLIC BLOOD PRESSURE: 112 MMHG | BODY MASS INDEX: 18.66 KG/M2 | WEIGHT: 112 LBS | HEIGHT: 65 IN | HEART RATE: 68 BPM | DIASTOLIC BLOOD PRESSURE: 72 MMHG

## 2025-06-19 DIAGNOSIS — E78.5 HYPERLIPIDEMIA LDL GOAL <70: Chronic | ICD-10-CM

## 2025-06-19 DIAGNOSIS — I25.118 CORONARY ARTERY DISEASE OF NATIVE ARTERY OF NATIVE HEART WITH STABLE ANGINA PECTORIS: Primary | Chronic | ICD-10-CM

## 2025-06-19 NOTE — PROGRESS NOTES
OFFICE VISIT  NOTE  [facility] CARDIOLOGY      Name: Clare Peace    Date: 2025  MRN:  [id number]  :  1950      REFERRING/PRIMARY PROVIDER:  Angela Keating APRN    Chief Complaint   Patient presents with    Coronary artery disease of native artery of native heart wi       HPI: Clare Peace is a 74 y.o. female who presents today for follow-up for CAD and hyperlipidemia.  Cardiac catheterization 10/25/22 showed mid LAD 75% lesions, status post PCI with MARIOLA, RCA 20 to 30%, normal circumflex.  Echo 10/25/2022 showed EF 55 to 60% with normal valves.  she has lifelong hypercholesterolemia as well as her whole family.  Intolerant to statins due to myalgia, has tried for in the past. Nexilezet caused similar symptoms to statins with muscle ache, fatigue, lack of energy.  She has been on Repatha since 10 of 2023, doing very well with it, she does have very elevated LP(a) from 2024 it was 255.  She was on Repatha at that time.  Most recent lipids from 2025 are excellent LDL 68 .    Past Medical History:   Diagnosis Date    Coronary artery disease     Hyperlipidemia     Osteoarthritis     PONV (postoperative nausea and vomiting)        Past Surgical History:   Procedure Laterality Date    BREAST AUGMENTATION      CARDIAC CATHETERIZATION N/A 10/13/2020    Procedure: Left Heart Cath;  Surgeon: Prince Mccollum MD;  Location:  [facility];  Service: Cardiology;  Laterality: N/A;    CARDIAC CATHETERIZATION  10/13/2020    Procedure: Functional Flow [place];  Surgeon: Prince Mccollum MD;  Location:  [facility];  Service: Cardiology;;    CARDIAC CATHETERIZATION Left 10/25/2022    Procedure: Left Heart Cath;  Surgeon: Prince Mccollum MD;  Location:  [facility];  Service: Cardiovascular;  Laterality: Left;    CAROTID STENT       SECTION      CORONARY STENT PLACEMENT      HAND SURGERY Right  12/26/2023    Fusion and reconstruction of digits -- Dr. Jurado    HAND SURGERY Right 02/08/2024    Fusion of pointer finger -Dr. Jurado    HYSTERECTOMY         Social History     Socioeconomic History    Marital status:    Tobacco Use    Smoking status: Never    Smokeless tobacco: Never   Vaping Use    Vaping status: Never Used   Substance and Sexual Activity    Alcohol use: Not Currently     Comment: weekly    Drug use: No    Sexual activity: Defer       Family History   Problem Relation Age of Onset    Diabetes Father     Stroke Father     Heart disease Sister     Heart disease Brother     Heart attack Brother     Heart disease Brother         ROS:   Constitutional no fever,  no weight loss   Skin no rash, no subcutaneous nodules   Otolaryngeal no difficulty swallowing   Cardiovascular See HPI   Pulmonary no cough, no sputum production   Gastrointestinal no constipation, no diarrhea   Genitourinary no dysuria, no hematuria   Hematologic no easy bruisability, no abnormal bleeding   Musculoskeletal no muscle pain   Neurologic no dizziness, no falls         Allergies   Allergen Reactions    Propofol Nausea Only    Codeine GI Intolerance    Scopolamine Other (See Comments)     GI upset    Statins Myalgia         Current Outpatient Medications:     aspirin (aspirin) 81 MG EC tablet, Take 1 tablet by mouth Daily., Disp: 90 tablet, Rfl: 3    buPROPion XL (Wellbutrin XL) 300 MG 24 hr tablet, Take 1 tablet by mouth Daily., Disp: , Rfl:     cycloSPORINE (RESTASIS) 0.05 % ophthalmic emulsion, , Disp: , Rfl:     Evolocumab (REPATHA) solution auto-injector SureClick injection, Inject 1 mL under the skin into the appropriate area as directed Every 14 (Fourteen) Days., Disp: 6 mL, Rfl: 3    ezetimibe (ZETIA) 10 MG tablet, TAKE 1 TABLET DAILY, Disp: 90 tablet, Rfl: 3    furosemide (LASIX) 40 MG tablet, Take 1 tablet by mouth As Needed., Disp: , Rfl:     glucosamine-chondroitin 500-400 MG capsule capsule, Take 1 capsule  "by mouth Daily., Disp: , Rfl:     Menaquinone-7 (VITAMIN K2 PO), Take 1 tablet by mouth As Needed., Disp: , Rfl:     nitroglycerin (NITROSTAT) 0.4 MG SL tablet, DISSOLVE 1 TABLET UNDER THE TONGUE AS NEEDED FOR ANGINA, MAY REPEAT EVERY 5 MINUTES FOR UP TO THREE DOSES, Disp: 25 tablet, Rfl: 44    omeprazole (priLOSEC) 20 MG capsule, Take 1 capsule by mouth Daily., Disp: , Rfl:     traZODone (DESYREL) 50 MG tablet, Take 2 tablets by mouth At Night As Needed., Disp: , Rfl: 3    tretinoin (RETIN-A) 0.1 % cream, APPLY A PEA SIZED AMOUNT TO CLEAN SKIN ON ENTIRE FACE BY TOPICAL ROUTE ONCE DAILY AT BEDTIME. FOLLOW WITH MOISTURIZER, Disp: , Rfl:     Turmeric 500 MG tablet, Take 1 tablet by mouth 2 (two) times a day., Disp: , Rfl:     Vitals:    06/19/25 0925   BP: 112/72   BP Location: Right arm   Patient Position: Sitting   Cuff Size: Adult   Pulse: 68   SpO2: 98%   Weight: 50.8 kg (112 lb)   Height: 165.1 cm (65\")       Body mass index is 18.64 kg/m².    PHYSICAL EXAM:    General Appearance:   · well developed  · well nourished  Neck:  · thyroid not enlarged  · supple  Respiratory:  · no respiratory distress  · normal breath sounds  · no rales  Cardiovascular:  · no jugular venous distention  · regular rhythm  · apical impulse normal  · S1 normal, S2 normal  · no S3, no S4   · no murmur  · no rub, no thrill  · carotid pulses normal; no bruit  · pedal pulses normal  · lower extremity edema: none    Skin:   warm, dry      RESULTS:   Procedures    Results for orders placed during the hospital encounter of 10/25/22    Adult Transthoracic Echo Complete W/ Cont if Necessary Per Protocol    Interpretation Summary    Left ventricular systolic function is normal. Left ventricular ejection fraction appears to be 56 - 60%.    Left ventricular diastolic function was normal.    No significant structural or functional valvular disease.    Discussed findings with pt by phone, she is feeling well post-pci with minimal/improving chest " pain.        Labs:  CBC 07/17/20   WBC 27.4   RBC 4.72   Hemoglobin 14.4   Hematocrit 44.8   Platelet 299       CMP  07/17/20   Glucose 89   BUN 22   Creatinine 0.82   Glomerular Filtration Rate 71   BUN/Creatinine Ratio NA   Sodium 139   Potassium 4.3   Carbon Dioxide 18 L   Calcium 9.4   AST 19   ALT 18       LIPID PANEL 07/17/20   Total 325 H   Triglycerides 83       H       TSH 1.12         ASSESSMENT:  Problem List Items Addressed This Visit       Hyperlipidemia LDL goal <70 (Chronic)    Coronary artery disease of native artery of native heart with stable angina pectoris - Primary (Chronic)    Overview   10/13/2020: Cardiac catheterization, LAD mid heavily calcified 50-60% stenosis at the bifurcation of the first diagonal, followed by a 40-50% of the second diagonal bifurcation and a 50 to 60% in the distal LAD, IFR mildly positive at 0.87.  RCA 20-30% diffuse stenosis.  Small nondominant left circumflex, without significant disease.,  LVEDP 4 mmHg.  Medical management as the patient has relatively mild symptoms, and intervention of the LAD will require rotational atherectomy.  10/25/2022: Mid LAD 3 tandem 75% lesions, OCT revealed MLA of 1.7 mm², status post PCI with overlapping 2.25 x 38 mm Xience MARIOLA overlapping with a 3.0 x 18 mm Xience MARIOLA postdilated with a 2.5 mm NC balloon distally and 3.5 mm NC balloon proximally.  Jailed first diagonal status post PTCA resolving WINDY 0 flow to WINDY-3 flow.  Mid RCA 30 to 40%, LVEDP 7 mmHg.              PLAN:    1.  CAD:  10/25/2022 status post PCI of the LAD, RCA 20-30%, normal  She completed 1 year of Plavix post PCI  Aspirin 81 mg for life  Continue Repatha    Doing well, symptoms resolved.  No beta-blocker due to perceived side effects.    The patient was advised to call 911 if chest pain worsens or persist despite nitroglycerin or rest.    2.  Severe hypercholesterolemia, with elevated LP(a):  Likely familial given the severity of her lipids, and  LP(a) very elevated at 255 12/2024  Lifetime ASCVD risk 39%  Patient was intolerant to at least 3 statins due to myalgia and recurrent UTI's.   intolerant to bempedoic acid    Prior to starting Repatha LDL was 194, 10/2023  Currently tolerating Repatha  CMP normal, lipids excellent on Repatha 5/2025 LDL 68 and     Continue Repatha indefinitely    She would benefit from LP(a) lowering, will await new drugs that are in the research pipeline.        Return to clinic in 12 months, or sooner as needed.    Thank you for the opportunity to share in the care of your patient; please do not hesitate to call me with any questions.     Prince Mccollum MD, [facility]  Office: [phone] [address] [place] [address]    06/19/25

## 2025-07-08 ENCOUNTER — SPECIALTY PHARMACY (OUTPATIENT)
Dept: CARDIOLOGY | Facility: CLINIC | Age: 75
End: 2025-07-08
Payer: MEDICARE

## 2025-07-08 NOTE — PROGRESS NOTES
Specialty Pharmacy Patient Management Program  Cardiology Specialty Pharmacy Refill Outreach      Clare is a 74 y.o. female contacted today regarding refills of her medication(s).    Specialty medication(s) and dose(s) confirmed: Repatha  Other medications being refilled: N/A    Refill Questions      Flowsheet Row Most Recent Value   Changes to allergies? No   Changes to medications? No   New conditions or infections since last clinic visit No   Unplanned office visit, urgent care, ED, or hospital admission in the last 4 weeks  No   How does patient/caregiver feel medication is working? Very good   Since the previous refill, were any specialty medication doses or scheduled injections missed or delayed?  No   Does this patient require a clinical escalation to a pharmacist? No            Delivery Questions      Flowsheet Row Most Recent Value   Delivery method [employer]   Delivery address Home   Number of medications in delivery 1   Medication(s) being filled and delivered Evolocumab (REPATHA)   Doses left of specialty medications 0   Copay verified? Yes   Copay amount $0   Copay form of payment No copayment ($0)   Delivery Date Selection 07/09/25   Signature Required No   Do you consent to receive electronic handouts?  No                   Follow-up: 84 days     Joyce Lemus CPhT  Pharmacy Care Coordinator  7/8/2025  12:00 EDT

## (undated) DEVICE — CATH DIAG EXPO .045 FL3  5F 100CM

## (undated) DEVICE — DEV INFL MONARCH 25W

## (undated) DEVICE — Device: Brand: OMNIWIRE PRESSURE GUIDE WIRE

## (undated) DEVICE — KT CATH IMG DRAGONFLY OPTIS 2.7F 135CM

## (undated) DEVICE — MINI TREK CORONARY DILATATION CATHETER 1.50 MM X 12 MM / RAPID-EXCHANGE: Brand: MINI TREK

## (undated) DEVICE — NC TREK CORONARY DILATATION CATHETER 2.5 MM X 20 MM / RAPID-EXCHANGE: Brand: NC TREK

## (undated) DEVICE — CATH DIAG EXPO .045 FL3.5 5F 100CM

## (undated) DEVICE — HI-TORQUE WHISPER MS GUIDE WIRE .014 STRAIGHT TIP 3.0 CM X 190 CM: Brand: HI-TORQUE WHISPER

## (undated) DEVICE — CANNULA,ADULT,SOFT-TOUCH,7'TUBE,UC: Brand: PENDING

## (undated) DEVICE — MODEL AT P65, P/N 701554-001KIT CONTENTS: HAND CONTROLLER, 3-WAY HIGH-PRESSURE STOPCOCK WITH ROTATING END AND PREMIUM HIGH-PRESSURE TUBING: Brand: ANGIOTOUCH® KIT

## (undated) DEVICE — KT VLV HEMO MAP ACC PLS LG/BORE MTL/INTRO W/TORQ/DEV

## (undated) DEVICE — NC TREK CORONARY DILATATION CATHETER 3.5 MM X 8 MM / RAPID-EXCHANGE: Brand: NC TREK

## (undated) DEVICE — CATH DIAG EXPO M/ PK 5F FL4/FR4 PIG

## (undated) DEVICE — DEV COMP RAD PRELUDESYNC 24CM

## (undated) DEVICE — ST EXT IV SMARTSITE 2VLV SP M LL 5ML IV1

## (undated) DEVICE — PK CATH CARD 10

## (undated) DEVICE — GLIDESHEATH SLENDER STAINLESS STEEL KIT: Brand: GLIDESHEATH SLENDER

## (undated) DEVICE — NC TREK CORONARY DILATATION CATHETER 3.25 MM X 12 MM / RAPID-EXCHANGE: Brand: NC TREK

## (undated) DEVICE — GW PT 2 MS .014 185CM STR TP

## (undated) DEVICE — Device: Brand: ASAHI SION BLUE

## (undated) DEVICE — MINI TREK CORONARY DILATATION CATHETER 2.0 MM X 12 MM / RAPID-EXCHANGE: Brand: MINI TREK

## (undated) DEVICE — GW PERIPH GUIDERIGHT STD/EXCHNG/J/TIP SS 0.035IN 5X260CM

## (undated) DEVICE — GUIDE CATHETER: Brand: MACH1™

## (undated) DEVICE — MODEL BT2000 P/N 700287-012KIT CONTENTS: MANIFOLD WITH SALINE AND CONTRAST PORTS, SALINE TUBING WITH SPIKE AND HAND SYRINGE, TRANSDUCER: Brand: BT2000 AUTOMATED MANIFOLD KIT

## (undated) DEVICE — ST INF PRI SMRTSTE 20DRP 2VLV 24ML 117

## (undated) DEVICE — SKIN PREP TRAY W/CHG: Brand: MEDLINE INDUSTRIES, INC.

## (undated) DEVICE — HI-TORQUE PILOT 50 GUIDE WIRE .014 STRAIGHT TIP 3.0 CM X 190 CM: Brand: HI-TORQUE PILOT

## (undated) DEVICE — NC TREK CORONARY DILATATION CATHETER 2.0 MM X 12 MM / RAPID-EXCHANGE: Brand: NC TREK